# Patient Record
Sex: FEMALE | Race: WHITE | NOT HISPANIC OR LATINO | Employment: PART TIME | URBAN - METROPOLITAN AREA
[De-identification: names, ages, dates, MRNs, and addresses within clinical notes are randomized per-mention and may not be internally consistent; named-entity substitution may affect disease eponyms.]

---
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
## 2017-02-27 NOTE — PATIENT INSTRUCTIONS
Lamictal 25 mg daily for 2 weeks then increase to 50 mg daily for 2 weeks.    Restart levothyroxine 100 mcg daily.    Next depo shot end of May.    I will message you via [employer] with lab results.    See me in 3 1/2 weeks for follow up.      Call me if any questions.  Halle Blue, CNP  [phone]      Preventive Health Recommendations  Female Ages 26 - 39  Yearly exam:   See your health care provider every year in order to    Review health changes.     Discuss preventive care.      Review your medicines if you your doctor has prescribed any.    Until age 30: Get a Pap test every three years (more often if you have had an abnormal result).    After age 30: Talk to your doctor about whether you should have a Pap test every 3 years or have a Pap test with HPV screening every 5 years.   You do not need a Pap test if your uterus was removed (hysterectomy) and you have not had cancer.  You should be tested each year for STDs (sexually transmitted diseases), if you're at risk.   Talk to your provider about how often to have your cholesterol checked.  If you are at risk for diabetes, you should have a diabetes test (fasting glucose).  Shots: Get a flu shot each year. Get a tetanus shot every 10 years.   Nutrition:     Eat at least 5 servings of fruits and vegetables each day.    Eat whole-grain bread, whole-wheat pasta and brown rice instead of white grains and rice.    Talk to your provider about Calcium and Vitamin D.     Lifestyle    Exercise at least 150 minutes a week (30 minutes a day, 5 days of the week). This will help you control your weight and prevent disease.    Limit alcohol to one drink per day.    No smoking.     Wear sunscreen to prevent skin cancer.    See your dentist every six months for an exam and cleaning.

## 2017-03-01 ENCOUNTER — OFFICE VISIT (OUTPATIENT)
Dept: FAMILY MEDICINE | Facility: OTHER | Age: 27
End: 2017-03-01
Payer: COMMERCIAL

## 2017-03-01 VITALS
BODY MASS INDEX: 26.12 KG/M2 | WEIGHT: 156.8 LBS | RESPIRATION RATE: 16 BRPM | OXYGEN SATURATION: 99 % | HEIGHT: 65 IN | TEMPERATURE: 97.7 F | DIASTOLIC BLOOD PRESSURE: 78 MMHG | SYSTOLIC BLOOD PRESSURE: 108 MMHG | HEART RATE: 113 BPM

## 2017-03-01 DIAGNOSIS — N89.8 VAGINAL ODOR: ICD-10-CM

## 2017-03-01 DIAGNOSIS — Z30.013 ENCOUNTER FOR INITIAL PRESCRIPTION OF INJECTABLE CONTRACEPTIVE: ICD-10-CM

## 2017-03-01 DIAGNOSIS — Z30.42 ENCOUNTER FOR SURVEILLANCE OF INJECTABLE CONTRACEPTIVE: ICD-10-CM

## 2017-03-01 DIAGNOSIS — Z12.4 SCREENING FOR MALIGNANT NEOPLASM OF CERVIX: ICD-10-CM

## 2017-03-01 DIAGNOSIS — Z00.00 ENCOUNTER FOR ROUTINE ADULT HEALTH EXAMINATION WITHOUT ABNORMAL FINDINGS: Primary | ICD-10-CM

## 2017-03-01 DIAGNOSIS — F31.62 BIPOLAR DISORDER, CURRENT EPISODE MIXED, MODERATE (H): ICD-10-CM

## 2017-03-01 DIAGNOSIS — E03.9 HYPOTHYROIDISM, UNSPECIFIED TYPE: ICD-10-CM

## 2017-03-01 DIAGNOSIS — Z11.3 ENCOUNTER FOR SCREENING EXAMINATION FOR SEXUALLY TRANSMITTED DISEASE: ICD-10-CM

## 2017-03-01 DIAGNOSIS — F17.200 TOBACCO USE DISORDER: ICD-10-CM

## 2017-03-01 LAB
BETA HCG QUAL IFA URINE: NEGATIVE
MICRO REPORT STATUS: NORMAL
SPECIMEN SOURCE: NORMAL
WET PREP SPEC: NORMAL

## 2017-03-01 PROCEDURE — 96372 THER/PROPH/DIAG INJ SC/IM: CPT | Performed by: STUDENT IN AN ORGANIZED HEALTH CARE EDUCATION/TRAINING PROGRAM

## 2017-03-01 PROCEDURE — 87210 SMEAR WET MOUNT SALINE/INK: CPT | Performed by: STUDENT IN AN ORGANIZED HEALTH CARE EDUCATION/TRAINING PROGRAM

## 2017-03-01 PROCEDURE — 99212 OFFICE O/P EST SF 10 MIN: CPT | Mod: 25 | Performed by: STUDENT IN AN ORGANIZED HEALTH CARE EDUCATION/TRAINING PROGRAM

## 2017-03-01 PROCEDURE — 36415 COLL VENOUS BLD VENIPUNCTURE: CPT | Performed by: STUDENT IN AN ORGANIZED HEALTH CARE EDUCATION/TRAINING PROGRAM

## 2017-03-01 PROCEDURE — 86780 TREPONEMA PALLIDUM: CPT | Performed by: STUDENT IN AN ORGANIZED HEALTH CARE EDUCATION/TRAINING PROGRAM

## 2017-03-01 PROCEDURE — 99395 PREV VISIT EST AGE 18-39: CPT | Mod: 25 | Performed by: STUDENT IN AN ORGANIZED HEALTH CARE EDUCATION/TRAINING PROGRAM

## 2017-03-01 PROCEDURE — 87591 N.GONORRHOEAE DNA AMP PROB: CPT | Performed by: STUDENT IN AN ORGANIZED HEALTH CARE EDUCATION/TRAINING PROGRAM

## 2017-03-01 PROCEDURE — 84703 CHORIONIC GONADOTROPIN ASSAY: CPT | Performed by: STUDENT IN AN ORGANIZED HEALTH CARE EDUCATION/TRAINING PROGRAM

## 2017-03-01 PROCEDURE — G0145 SCR C/V CYTO,THINLAYER,RESCR: HCPCS | Performed by: STUDENT IN AN ORGANIZED HEALTH CARE EDUCATION/TRAINING PROGRAM

## 2017-03-01 PROCEDURE — 84439 ASSAY OF FREE THYROXINE: CPT | Performed by: STUDENT IN AN ORGANIZED HEALTH CARE EDUCATION/TRAINING PROGRAM

## 2017-03-01 PROCEDURE — 87389 HIV-1 AG W/HIV-1&-2 AB AG IA: CPT | Performed by: STUDENT IN AN ORGANIZED HEALTH CARE EDUCATION/TRAINING PROGRAM

## 2017-03-01 PROCEDURE — 84443 ASSAY THYROID STIM HORMONE: CPT | Performed by: STUDENT IN AN ORGANIZED HEALTH CARE EDUCATION/TRAINING PROGRAM

## 2017-03-01 PROCEDURE — 87491 CHLMYD TRACH DNA AMP PROBE: CPT | Performed by: STUDENT IN AN ORGANIZED HEALTH CARE EDUCATION/TRAINING PROGRAM

## 2017-03-01 RX ORDER — LAMOTRIGINE 25 MG/1
25 TABLET ORAL DAILY
Qty: 42 TABLET | Refills: 0 | Status: SHIPPED | OUTPATIENT
Start: 2017-03-01 | End: 2017-09-29

## 2017-03-01 RX ORDER — MEDROXYPROGESTERONE ACETATE 150 MG/ML
150 INJECTION, SUSPENSION INTRAMUSCULAR
Qty: 3 ML | Refills: 3 | OUTPATIENT
Start: 2017-03-01 | End: 2017-09-29

## 2017-03-01 ASSESSMENT — ANXIETY QUESTIONNAIRES
GAD7 TOTAL SCORE: 21
7. FEELING AFRAID AS IF SOMETHING AWFUL MIGHT HAPPEN: 3 = NEARLY EVERY DAY

## 2017-03-01 ASSESSMENT — PAIN SCALES - GENERAL: PAINLEVEL: MODERATE PAIN (5)

## 2017-03-01 NOTE — PROGRESS NOTES
Discussed these results with patient in the clinic.    Molly Blue NP-C  [facility]

## 2017-03-01 NOTE — MR AVS SNAPSHOT
After Visit Summary   3/1/2017    Evita Cornelius    MRN: [id number]           Patient Information     Date Of Birth          1990        Visit Information        Provider Department      3/1/2017 2:40 PM Molly Blue APRN [facility]        Today's Diagnoses     Encounter for initial prescription of injectable contraceptive    -  1    Screening for malignant neoplasm of cervix        Tobacco use disorder        Need for HPV vaccine        Need for prophylactic vaccination and inoculation against influenza        Hypothyroidism, unspecified type        Encounter for screening examination for sexually transmitted disease        Bipolar disorder, current episode mixed, moderate (H)        Vaginal odor        Encounter for surveillance of injectable contraceptive          Care Instructions    Lamictal 25 mg daily for 2 weeks then increase to 50 mg daily for 2 weeks.    Restart levothyroxine 100 mcg daily.    Next depo shot end of May.    I will message you via [employer] with lab results.    See me in 3 1/2 weeks for follow up.      Call me if any questions.  Halle Blue, CNP  [phone]      Preventive Health Recommendations  Female Ages 26 - 39  Yearly exam:   See your health care provider every year in order to    Review health changes.     Discuss preventive care.      Review your medicines if you your doctor has prescribed any.    Until age 30: Get a Pap test every three years (more often if you have had an abnormal result).    After age 30: Talk to your doctor about whether you should have a Pap test every 3 years or have a Pap test with HPV screening every 5 years.   You do not need a Pap test if your uterus was removed (hysterectomy) and you have not had cancer.  You should be tested each year for STDs (sexually transmitted diseases), if you're at risk.   Talk to your provider about how often to have your cholesterol checked.  If you are at risk for diabetes,  you should have a diabetes test (fasting glucose).  Shots: Get a flu shot each year. Get a tetanus shot every 10 years.   Nutrition:     Eat at least 5 servings of fruits and vegetables each day.    Eat whole-grain bread, whole-wheat pasta and brown rice instead of white grains and rice.    Talk to your provider about Calcium and Vitamin D.     Lifestyle    Exercise at least 150 minutes a week (30 minutes a day, 5 days of the week). This will help you control your weight and prevent disease.    Limit alcohol to one drink per day.    No smoking.     Wear sunscreen to prevent skin cancer.    See your dentist every six months for an exam and cleaning.          Follow-ups after your visit        Who to contact     If you have questions or need follow up information about today's clinic visit or your schedule please contact [facility] directly at [phone].  Normal or non-critical lab and imaging results will be communicated to you by [employer], letter or phone within 4 business days after the clinic has received the results. If you do not hear from us within 7 days, please contact the clinic through [employer] or phone. If you have a critical or abnormal lab result, we will notify you by phone as soon as possible.  Submit refill requests through Walk-in or call your pharmacy and they will forward the refill request to us. Please allow 3 business days for your refill to be completed.          Additional Information About Your Visit        [employer] Information     Walk-in gives you secure access to your electronic health record. If you see a primary care provider, you can also send messages to your care team and make appointments. If you have questions, please call your primary care clinic.  If you do not have a primary care provider, please call [phone] and they will assist you.        Care EveryWhere ID     This is your Care EveryWhere ID. This could be used by other organizations to access your  "[facility] medical records  [id number]        Your Vitals Were     Pulse Temperature Respirations Height Last Period Pulse Oximetry    113 97.7  F (36.5  C) (Temporal) 16 5' 5.12\" (1.654 m) 02/08/2017 (Approximate) 99%    BMI (Body Mass Index)                   26 kg/m2            Blood Pressure from Last 3 Encounters:   03/01/17 108/78   08/02/16 127/84   05/01/16 125/77    Weight from Last 3 Encounters:   03/01/17 156 lb 12.8 oz (71.1 kg)   08/02/16 148 lb (67.1 kg)   05/01/16 149 lb (67.6 kg)              We Performed the Following     Anti Treponema     Beta HCG qual IFA urine     DEPRESSION ACTION PLAN (DAP) Order [[id number]]     HIV Antigen Antibody Combo     Pap imaged thin layer screen reflex to HPV if ASCUS - recommend age 25 - 29     TOBACCO CESSATION ORDER FOR HM     TSH with free T4 reflex     Wet prep          Today's Medication Changes          These changes are accurate as of: 3/1/17  4:24 PM.  If you have any questions, ask your nurse or doctor.               Start taking these medicines.        Dose/Directions    lamoTRIgine 25 MG tablet   Commonly known as:  LaMICtal   Used for:  Bipolar disorder, current episode mixed, moderate (H)   Started by:  Molly Blue APRN CNP        Dose:  25 mg   Take 1 tablet (25 mg) by mouth daily for 2 weeks; then 50 mg once daily for 2 weeks   Quantity:  42 tablet   Refills:  0            Where to get your medicines      These medications were sent to [facility] [address] [address]     Phone:  [phone]     lamoTRIgine 25 MG tablet         Some of these will need a paper prescription and others can be bought over the counter.  Ask your nurse if you have questions.     You don't need a prescription for these medications     medroxyPROGESTERone 150 MG/ML injection                Primary Care Provider Office Phone # Fax #    DEBBIE Kennedy [phone]    "    [facility] [address]  [facility] [address]        Thank you!     Thank you for choosing [facility]  for your care. Our goal is always to provide you with excellent care. Hearing back from our patients is one way we can continue to improve our services. Please take a few minutes to complete the written survey that you may receive in the mail after your visit with us. Thank you!             Your Updated Medication List - Protect others around you: Learn how to safely use, store and throw away your medicines at www.disposemymeds.org.          This list is accurate as of: 3/1/17  4:24 PM.  Always use your most recent med list.                   Brand Name Dispense Instructions for use    lamoTRIgine 25 MG tablet    LaMICtal    42 tablet    Take 1 tablet (25 mg) by mouth daily for 2 weeks; then 50 mg once daily for 2 weeks       levothyroxine 100 MCG tablet    SYNTHROID    30 tablet    Take 1 tablet (100 mcg) by mouth daily       medroxyPROGESTERone 150 MG/ML injection    DEPO-PROVERA    3 mL    Inject 1 mL (150 mg) into the muscle every 3 months       oxyCODONE-acetaminophen 5-325 MG per tablet    PERCOCET    15 tablet    Take 1-2 tablets by mouth every 4 hours as needed for moderate to severe pain

## 2017-03-01 NOTE — NURSING NOTE
BLOOD PRESSURE: 108/78    DATE OF LAST PAP or ANNUAL EXAM:   Lab Results   Component Value Date    PAP NIL 02/26/2014     URINE HCG:negative    The following medication was given:     MEDICATION: Depo Provera 150mg  ROUTE: IM  SITE: Arm - Left  : Sova  LOT #: [id number]  EXPIRATION:02/2019  NEXT INJECTION DUE: May 17 - May 31  Provider: Molly Blue

## 2017-03-01 NOTE — NURSING NOTE
"Chief Complaint   Patient presents with     Physical     Panel Management     height, hpv, flu, pap, tobacco, dap, phq       Initial /78 (BP Location: Left arm, Patient Position: Chair, Cuff Size: Adult Regular)  Pulse 113  Temp 97.7  F (36.5  C) (Temporal)  Resp 16  Ht 5' 5.12\" (1.654 m)  Wt 156 lb 12.8 oz (71.1 kg)  LMP 02/08/2017 (Approximate)  SpO2 99%  BMI 26 kg/m2 Estimated body mass index is 26 kg/(m^2) as calculated from the following:    Height as of this encounter: 5' 5.12\" (1.654 m).    Weight as of this encounter: 156 lb 12.8 oz (71.1 kg).  Medication Reconciliation: complete   Ashley Perdomo CMA      "

## 2017-03-01 NOTE — PROGRESS NOTES
Discussed these results with patient in the clinic.    Molly Blue NP-C  [facility]

## 2017-03-02 LAB
HIV 1+2 AB+HIV1 P24 AG SERPL QL IA: NORMAL
T PALLIDUM IGG+IGM SER QL: NEGATIVE
T4 FREE SERPL-MCNC: 0.5 NG/DL (ref 0.76–1.46)
TSH SERPL DL<=0.005 MIU/L-ACNC: 76.1 MU/L (ref 0.4–4)

## 2017-03-03 ENCOUNTER — TELEPHONE (OUTPATIENT)
Dept: FAMILY MEDICINE | Facility: OTHER | Age: 27
End: 2017-03-03

## 2017-03-03 DIAGNOSIS — Z11.3 ENCOUNTER FOR SCREENING EXAMINATION FOR SEXUALLY TRANSMITTED DISEASE: Primary | ICD-10-CM

## 2017-03-03 NOTE — TELEPHONE ENCOUNTER
Reason for call:  [facility] calling about appt they ar canceling.   [phone] Jayshree

## 2017-03-03 NOTE — TELEPHONE ENCOUNTER
Huddled with EM about this - will have patient come back in for urine sample. Order pended, please sign.  Fabiana Erickson CMA

## 2017-03-03 NOTE — TELEPHONE ENCOUNTER
Called Jayshree from Infectious Disease at [facility] - she states they are cancelling the test for Chlamydia/Gonorrhea due to inappropriate specimen. She states it was sent with a blue swab (which is correct) but it was also sent with a large white swab in the container. This compromises test and makes it invalid. Will need to recollect and reorder a specimen.  Will route to EM to review/advise.  Fabiana Erickson, CMA

## 2017-03-04 PROBLEM — F31.62 BIPOLAR DISORDER, CURRENT EPISODE MIXED, MODERATE (H): Status: ACTIVE | Noted: 2017-03-04

## 2017-03-06 LAB
COPATH REPORT: NORMAL
PAP: NORMAL

## 2017-09-29 ENCOUNTER — OFFICE VISIT (OUTPATIENT)
Dept: FAMILY MEDICINE | Facility: OTHER | Age: 27
End: 2017-09-29
Payer: COMMERCIAL

## 2017-09-29 VITALS
WEIGHT: 173.6 LBS | SYSTOLIC BLOOD PRESSURE: 92 MMHG | RESPIRATION RATE: 18 BRPM | TEMPERATURE: 98 F | DIASTOLIC BLOOD PRESSURE: 62 MMHG | BODY MASS INDEX: 28.78 KG/M2 | OXYGEN SATURATION: 98 % | HEART RATE: 76 BPM

## 2017-09-29 DIAGNOSIS — N92.6 MISSED PERIOD: Primary | ICD-10-CM

## 2017-09-29 DIAGNOSIS — E03.9 HYPOTHYROIDISM, UNSPECIFIED TYPE: ICD-10-CM

## 2017-09-29 DIAGNOSIS — Z23 NEED FOR PROPHYLACTIC VACCINATION AND INOCULATION AGAINST INFLUENZA: ICD-10-CM

## 2017-09-29 DIAGNOSIS — E03.9 ACQUIRED HYPOTHYROIDISM: ICD-10-CM

## 2017-09-29 LAB
BETA HCG QUAL IFA URINE: NEGATIVE
T4 FREE SERPL-MCNC: 0.56 NG/DL (ref 0.76–1.46)
TSH SERPL DL<=0.005 MIU/L-ACNC: 37.2 MU/L (ref 0.4–4)

## 2017-09-29 PROCEDURE — 90686 IIV4 VACC NO PRSV 0.5 ML IM: CPT | Performed by: NURSE PRACTITIONER

## 2017-09-29 PROCEDURE — 99213 OFFICE O/P EST LOW 20 MIN: CPT | Mod: 25 | Performed by: NURSE PRACTITIONER

## 2017-09-29 PROCEDURE — 84439 ASSAY OF FREE THYROXINE: CPT | Performed by: NURSE PRACTITIONER

## 2017-09-29 PROCEDURE — 36415 COLL VENOUS BLD VENIPUNCTURE: CPT | Performed by: NURSE PRACTITIONER

## 2017-09-29 PROCEDURE — 84443 ASSAY THYROID STIM HORMONE: CPT | Performed by: NURSE PRACTITIONER

## 2017-09-29 PROCEDURE — 90471 IMMUNIZATION ADMIN: CPT | Performed by: NURSE PRACTITIONER

## 2017-09-29 PROCEDURE — 84703 CHORIONIC GONADOTROPIN ASSAY: CPT | Performed by: NURSE PRACTITIONER

## 2017-09-29 RX ORDER — LEVOTHYROXINE SODIUM 100 UG/1
100 TABLET ORAL DAILY
Qty: 30 TABLET | Refills: 5 | Status: SHIPPED | OUTPATIENT
Start: 2017-09-29 | End: 2019-04-24

## 2017-09-29 ASSESSMENT — ANXIETY QUESTIONNAIRES
IF YOU CHECKED OFF ANY PROBLEMS ON THIS QUESTIONNAIRE, HOW DIFFICULT HAVE THESE PROBLEMS MADE IT FOR YOU TO DO YOUR WORK, TAKE CARE OF THINGS AT HOME, OR GET ALONG WITH OTHER PEOPLE: EXTREMELY DIFFICULT
6. BECOMING EASILY ANNOYED OR IRRITABLE: NEARLY EVERY DAY
1. FEELING NERVOUS, ANXIOUS, OR ON EDGE: NEARLY EVERY DAY
GAD7 TOTAL SCORE: 20
3. WORRYING TOO MUCH ABOUT DIFFERENT THINGS: NEARLY EVERY DAY
5. BEING SO RESTLESS THAT IT IS HARD TO SIT STILL: NEARLY EVERY DAY
2. NOT BEING ABLE TO STOP OR CONTROL WORRYING: MORE THAN HALF THE DAYS
7. FEELING AFRAID AS IF SOMETHING AWFUL MIGHT HAPPEN: NEARLY EVERY DAY

## 2017-09-29 ASSESSMENT — PATIENT HEALTH QUESTIONNAIRE - PHQ9
10. IF YOU CHECKED OFF ANY PROBLEMS, HOW DIFFICULT HAVE THESE PROBLEMS MADE IT FOR YOU TO DO YOUR WORK, TAKE CARE OF THINGS AT HOME, OR GET ALONG WITH OTHER PEOPLE: VERY DIFFICULT
SUM OF ALL RESPONSES TO PHQ QUESTIONS 1-9: 23
5. POOR APPETITE OR OVEREATING: NEARLY EVERY DAY
SUM OF ALL RESPONSES TO PHQ QUESTIONS 1-9: 23

## 2017-09-29 NOTE — PROGRESS NOTES
SUBJECTIVE:                                                    Evita Cornelius is a 27 year old female who presents to clinic today for the following health issues:    History of Present Illness   Depression & Anxiety Follow-up:     Depression/Anxiety:  Depression & Anxiety    Status since last visit::  Worsened    Other associated symptoms of depression and anxiety::  None    Significant life event::  YES (was in [facility])    Current substance use::  None  Hypothyroidism:     Since last visit, patient describes the following symptoms::  Hair loss and Weight gain    Weight gain::  >20 lbs.    Answers for HPI/ROS submitted by the patient on 9/29/2017   If you checked off any problems, how difficult have these problems made it for you to do your work, take care of things at home, or get along with other people?: Very difficult  PHQ9 TOTAL SCORE: 23    JOSE-7 SCORE 5/28/2015 3/1/2017 9/29/2017   Total Score 14 - -   Total Score - 21 (severe anxiety) -   Total Score - - 20       Hypothyroidism Follow-up      Since last visit, patient describes the following symptoms: weight gain of  lbs, anxiety and hair loss    Wt Readings from Last 2 Encounters:   09/29/17 173 lb 9.6 oz (78.7 kg)   03/01/17 156 lb 12.8 oz (71.1 kg)         Problem list and histories reviewed & adjusted, as indicated.  Additional history: as documented    Abnormal Mood Symptoms      Duration: Charges with the law, trying to get kids back,looking to do inpatient for drugs- Meth     Description:  Depression: YES  Anxiety: YES  Panic attacks: YES     Accompanying signs and symptoms: see PHQ-9 and JOSE scores    History (similar episodes/previous evaluation): Yes not as bad as it is now     Precipitating or alleviating factors: Trouble with the law, just got out of senior care was there for about a month then went back over night due to a dirty UA found drugs.     Therapies tried and outcome: Unsure, problems remembering to take medications.     LMP- August 17th.  Late by a week.         Current Outpatient Prescriptions   Medication Sig Dispense Refill     lamoTRIgine (LAMICTAL) 25 MG tablet Take 1 tablet (25 mg) by mouth daily for 2 weeks; then 50 mg once daily for 2 weeks 42 tablet 0     medroxyPROGESTERone (DEPO-PROVERA) 150 MG/ML injection Inject 1 mL (150 mg) into the muscle every 3 months 3 mL 3     levothyroxine (SYNTHROID) 100 MCG tablet Take 1 tablet (100 mcg) by mouth daily 30 tablet 5     oxyCODONE-acetaminophen (PERCOCET) 5-325 MG per tablet Take 1-2 tablets by mouth every 4 hours as needed for moderate to severe pain 15 tablet 0     Allergies   Allergen Reactions     No Known Drug Allergies      BP Readings from Last 3 Encounters:   09/29/17 92/62   03/01/17 108/78   08/02/16 127/84    Wt Readings from Last 3 Encounters:   09/29/17 173 lb 9.6 oz (78.7 kg)   03/01/17 156 lb 12.8 oz (71.1 kg)   08/02/16 148 lb (67.1 kg)               ROS:  C: NEGATIVE for fever Positive for chills and weight gain.   E/M: NEGATIVE for ear, mouth and throat problems  R: NEGATIVE for significant cough or SOB  CV: NEGATIVE for chest pain, palpitations or peripheral edema    OBJECTIVE:     BP 92/62 (BP Location: Left arm, Patient Position: Chair, Cuff Size: Adult Large)  Pulse 76  Temp 98  F (36.7  C) (Temporal)  Resp 18  Wt 173 lb 9.6 oz (78.7 kg)  SpO2 98%  BMI 28.78 kg/m2  Body mass index is 28.78 kg/(m^2).  GENERAL: healthy, alert and no distress  EYES: Eyes grossly normal to inspection, PERRL and conjunctivae and sclerae normal  NECK: no adenopathy, no asymmetry, masses, or scars and thyroid normal to palpation  RESP: lungs clear to auscultation - no rales, rhonchi or wheezes  CV: regular rate and rhythm, normal S1 S2, no S3 or S4, no murmur, click or rub, no peripheral edema and peripheral pulses strong  ABDOMEN: soft, nontender, no hepatosplenomegaly, no masses and bowel sounds normal  SKIN: no suspicious lesions or rashes  NEURO: Normal strength and tone, mentation intact  and speech normal  PSYCH: mentation appears normal, affect normal/bright    Diagnostic Test Results:  Results for orders placed or performed in visit on 09/29/17   Beta HCG qual IFA urine - [facility] and [place]   Result Value Ref Range    Beta HCG Qual IFA Urine Negative NEG^Negative      TSH with free T4 reflex   Result Value Ref Range    TSH 37.20 (H) 0.40 - 4.00 mU/L   T4 free   Result Value Ref Range    T4 Free 0.56 (L) 0.76 - 1.46 ng/dL       ASSESSMENT/PLAN:     1. Missed period  - Urine pregnancy negative  - Advised to test at home if continued to not have period. Patient also just recently stopped Depo. Last Period August 17th.   - Beta HCG qual IFA urine - [facility] and [place]      2. Hypothyroidism, unspecified type  - Restart medication, recheck in 6 weeks. Since you have been off of it we know that you need to restart medication. If in 6 weeks still off will adjust dose if taking consistently.   - TSH with free T4 reflex  - TSH with free T4 reflex; Future    3. Need for prophylactic vaccination and inoculation against influenza    - FLU VAC, SPLIT VIRUS IM > 3 YO (QUADRIVALENT) [[id number]]  - Vaccine Administration, Initial [[id number]]      Addendum  4. Acquired hypothyroidism  - Recheck in 6 weeks, restart medication.   - levothyroxine (SYNTHROID) 100 MCG tablet; Take 1 tablet (100 mcg) by mouth daily  Dispense: 30 tablet; Refill: 5    Patient Instructions   - Will do pregnancy test today  - Will check TSH, restart your Levothyroxine at 100mcg we may need to increase depending on levels. Recheck in 6 weeks.   - Return to clinic in 3 months.     DEBBIE Holbrook CNP, APRN CNP  [facility]

## 2017-09-29 NOTE — MR AVS SNAPSHOT
After Visit Summary   9/29/2017    Evita Cornelius    MRN: [id number]           Patient Information     Date Of Birth          1990        Visit Information        Provider Department      9/29/2017 2:00 PM Almita Vieyra APRN CNP [facility]        Today's Diagnoses     Missed period    -  1    Hypothyroidism, unspecified type        Need for prophylactic vaccination and inoculation against influenza        Acquired hypothyroidism          Care Instructions    - Will do pregnancy test today  - Will check TSH, restart your Levothyroxine at 100mcg we may need to increase depending on levels. Recheck in 6 weeks.   - Return to clinic in 3 months.     DEBBIE Holbrook CNP            Follow-ups after your visit        Follow-up notes from your care team     Return in about 3 months (around 12/29/2017), or if symptoms worsen or fail to improve.      Who to contact     If you have questions or need follow up information about today's clinic visit or your schedule please contact [facility] directly at [phone].  Normal or non-critical lab and imaging results will be communicated to you by GleeMasterhart, letter or phone within 4 business days after the clinic has received the results. If you do not hear from us within 7 days, please contact the clinic through Netadmin or phone. If you have a critical or abnormal lab result, we will notify you by phone as soon as possible.  Submit refill requests through Netadmin or call your pharmacy and they will forward the refill request to us. Please allow 3 business days for your refill to be completed.          Additional Information About Your Visit        [employer] Information     Netadmin gives you secure access to your electronic health record. If you see a primary care provider, you can also send messages to your care team and make appointments. If you have questions, please call your primary care clinic.  If you do not have a  primary care provider, please call [phone] and they will assist you.        Care EveryWhere ID     This is your Care EveryWhere ID. This could be used by other organizations to access your [place] medical records  [id number]        Your Vitals Were     Pulse Temperature Respirations Pulse Oximetry BMI (Body Mass Index)       76 98  F (36.7  C) (Temporal) 18 98% 28.78 kg/m2        Blood Pressure from Last 3 Encounters:   09/29/17 92/62   03/01/17 108/78   08/02/16 127/84    Weight from Last 3 Encounters:   09/29/17 173 lb 9.6 oz (78.7 kg)   03/01/17 156 lb 12.8 oz (71.1 kg)   08/02/16 148 lb (67.1 kg)              We Performed the Following     Beta HCG qual IFA urine - [facility] and [facility]y     FLU VAC, SPLIT VIRUS IM > 3 YO (QUADRIVALENT) [[id number]]     T4 free     TSH with free T4 reflex     Vaccine Administration, Initial [30539]          Where to get your medicines      These medications were sent to [facility] - [address]   [address] [place] [address]     Phone:  [phone]     levothyroxine 100 MCG tablet          Primary Care Provider Office Phone # Fax #    Molly DEBBIE Leonard [facility] [phone]       [address]  [place] [address]        Equal Access to Services     ROLANDO ESPINOSA AH: Hadii nolvia ku hadasho Soomaali, waaxda luqadaha, qaybta kaalmada jennifer, thomas silverman. So [facility] [phone].    ATENCIÓN: Si habla español, tiene a davenport disposición servicios gratuitos de asistencia lingüística. Dilan al [phone].    We comply with applicable federal civil rights laws and [place] laws. We do not discriminate on the basis of race, color, national origin, age, disability, sex, sexual orientation, or gender identity.            Thank you!     Thank you for choosing [facility]  for your care. Our goal is always to provide you with excellent care. Hearing back from our patients  is one way we can continue to improve our services. Please take a few minutes to complete the written survey that you may receive in the mail after your visit with us. Thank you!             Your Updated Medication List - Protect others around you: Learn how to safely use, store and throw away your medicines at www.disposemymeds.org.          This list is accurate as of: 9/29/17 11:59 PM.  Always use your most recent med list.                   Brand Name Dispense Instructions for use Diagnosis    levothyroxine 100 MCG tablet    SYNTHROID    30 tablet    Take 1 tablet (100 mcg) by mouth daily    Acquired hypothyroidism

## 2017-09-29 NOTE — PATIENT INSTRUCTIONS
- Will do pregnancy test today  - Will check TSH, restart your Levothyroxine at 100mcg we may need to increase depending on levels. Recheck in 6 weeks.   - Return to clinic in 3 months.     DEBBIE Holbrook CNP

## 2017-09-30 ASSESSMENT — PATIENT HEALTH QUESTIONNAIRE - PHQ9: SUM OF ALL RESPONSES TO PHQ QUESTIONS 1-9: 23

## 2017-09-30 ASSESSMENT — ANXIETY QUESTIONNAIRES: GAD7 TOTAL SCORE: 20

## 2018-05-29 ENCOUNTER — TELEPHONE (OUTPATIENT)
Dept: FAMILY MEDICINE | Facility: OTHER | Age: 28
End: 2018-05-29

## 2018-05-29 ENCOUNTER — OFFICE VISIT (OUTPATIENT)
Dept: FAMILY MEDICINE | Facility: OTHER | Age: 28
End: 2018-05-29
Payer: COMMERCIAL

## 2018-05-29 VITALS
TEMPERATURE: 97.8 F | RESPIRATION RATE: 16 BRPM | WEIGHT: 169.4 LBS | HEIGHT: 65 IN | HEART RATE: 88 BPM | BODY MASS INDEX: 28.22 KG/M2 | SYSTOLIC BLOOD PRESSURE: 102 MMHG | DIASTOLIC BLOOD PRESSURE: 76 MMHG

## 2018-05-29 DIAGNOSIS — Z30.013 ENCOUNTER FOR INITIAL PRESCRIPTION OF INJECTABLE CONTRACEPTIVE: Primary | ICD-10-CM

## 2018-05-29 LAB — BETA HCG QUAL IFA URINE: NEGATIVE

## 2018-05-29 PROCEDURE — 84703 CHORIONIC GONADOTROPIN ASSAY: CPT | Performed by: FAMILY MEDICINE

## 2018-05-29 PROCEDURE — 96372 THER/PROPH/DIAG INJ SC/IM: CPT | Performed by: FAMILY MEDICINE

## 2018-05-29 PROCEDURE — 99213 OFFICE O/P EST LOW 20 MIN: CPT | Mod: 25 | Performed by: FAMILY MEDICINE

## 2018-05-29 RX ORDER — MEDROXYPROGESTERONE ACETATE 150 MG/ML
150 INJECTION, SUSPENSION INTRAMUSCULAR
Qty: 3 ML | Refills: 3 | OUTPATIENT
Start: 2018-05-29 | End: 2019-07-22

## 2018-05-29 ASSESSMENT — ANXIETY QUESTIONNAIRES
3. WORRYING TOO MUCH ABOUT DIFFERENT THINGS: NOT AT ALL
6. BECOMING EASILY ANNOYED OR IRRITABLE: NEARLY EVERY DAY
1. FEELING NERVOUS, ANXIOUS, OR ON EDGE: NOT AT ALL
7. FEELING AFRAID AS IF SOMETHING AWFUL MIGHT HAPPEN: NOT AT ALL
GAD7 TOTAL SCORE: 3
5. BEING SO RESTLESS THAT IT IS HARD TO SIT STILL: NOT AT ALL
2. NOT BEING ABLE TO STOP OR CONTROL WORRYING: NOT AT ALL
IF YOU CHECKED OFF ANY PROBLEMS ON THIS QUESTIONNAIRE, HOW DIFFICULT HAVE THESE PROBLEMS MADE IT FOR YOU TO DO YOUR WORK, TAKE CARE OF THINGS AT HOME, OR GET ALONG WITH OTHER PEOPLE: NOT DIFFICULT AT ALL

## 2018-05-29 ASSESSMENT — PATIENT HEALTH QUESTIONNAIRE - PHQ9: 5. POOR APPETITE OR OVEREATING: NOT AT ALL

## 2018-05-29 ASSESSMENT — PAIN SCALES - GENERAL: PAINLEVEL: NO PAIN (0)
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
## 2018-05-29 NOTE — LETTER
My Depression Action Plan  Name: Evita Cornelius   Date of Birth 1990  Date: 5/29/2018    My doctor: Molly Blue   My clinic: [facility]  [address] [place] [address] [phone]          GREEN    ZONE   Good Control    What it looks like:     Things are going generally well. You have normal up s and down s. You may even feel depressed from time to time, but bad moods usually last less than a day.   What you need to do:  1. Continue to care for yourself (see self care plan)  2. Check your depression survival kit and update it as needed  3. Follow your physician s recommendations including any medication.  4. Do not stop taking medication unless you consult with your physician first.           YELLOW         ZONE Getting Worse    What it looks like:     Depression is starting to interfere with your life.     It may be hard to get out of bed; you may be starting to isolate yourself from others.    Symptoms of depression are starting to last most all day and this has happened for several days.     You may have suicidal thoughts but they are not constant.   What you need to do:     1. Call your care team, your response to treatment will improve if you keep your care team informed of your progress. Yellow periods are signs an adjustment may need to be made.     2. Continue your self-care, even if you have to fake it!    3. Talk to someone in your support network    4. Open up your depression survival kit           RED    ZONE Medical Alert - Get Help    What it looks like:     Depression is seriously interfering with your life.     You may experience these or other symptoms: You can t get out of bed most days, can t work or engage in other necessary activities, you have trouble taking care of basic hygiene, or basic responsibilities, thoughts of suicide or death that will not go away, self-injurious behavior.     What you need to do:  1. Call your care  team and request a same-day appointment. If they are not available (weekends or after hours) call your local crisis line, emergency room or 911.            Depression Self Care Plan / Survival Kit    Self-Care for Depression  Here s the deal. Your body and mind are really not as separate as most people think.  What you do and think affects how you feel and how you feel influences what you do and think. This means if you do things that people who feel good do, it will help you feel better.  Sometimes this is all it takes.  There is also a place for medication and therapy depending on how severe your depression is, so be sure to consult with your medical provider and/ or Behavioral Health Consultant if your symptoms are worsening or not improving.     In order to better manage my stress, I will:    Exercise  Get some form of exercise, every day. This will help reduce pain and release endorphins, the  feel good  chemicals in your brain. This is almost as good as taking antidepressants!  This is not the same as joining a gym and then never going! (they count on that by the way ) It can be as simple as just going for a walk or doing some gardening, anything that will get you moving.      Hygiene   Maintain good hygiene (Get out of bed in the morning, Make your bed, Brush your teeth, Take a shower, and Get dressed like you were going to work, even if you are unemployed).  If your clothes don't fit try to get ones that do.    Diet  I will strive to eat foods that are good for me, drink plenty of water, and avoid excessive sugar, caffeine, alcohol, and other mood-altering substances.  Some foods that are helpful in depression are: complex carbohydrates, B vitamins, flaxseed, fish or fish oil, fresh fruits and vegetables.    Psychotherapy  I agree to participate in Individual Therapy (if recommended).    Medication  If prescribed medications, I agree to take them.  Missing doses can result in serious side effects.  I  understand that drinking alcohol, or other illicit drug use, may cause potential side effects.  I will not stop my medication abruptly without first discussing it with my provider.    Staying Connected With Others  I will stay in touch with my friends, family members, and my primary care provider/team.    Use your imagination  Be creative.  We all have a creative side; it doesn t matter if it s oil painting, sand castles, or mud pies! This will also kick up the endorphins.    Witness Beauty  (AKA stop and smell the roses) Take a look outside, even in mid-winter. Notice colors, textures. Watch the squirrels and birds.     Service to others  Be of service to others.  There is always someone else in need.  By helping others we can  get out of ourselves  and remember the really important things.  This also provides opportunities for practicing all the other parts of the program.    Humor  Laugh and be silly!  Adjust your TV habits for less news and crime-drama and more comedy.    Control your stress  Try breathing deep, massage therapy, biofeedback, and meditation. Find time to relax each day.     My support system    Clinic Contact:  Phone number:    Contact 1:  Phone number:    Contact 2:  Phone number:    Gnosticist/:  Phone number:    Therapist:  Phone number:    Local crisis center:    Phone number:    Other community support:  Phone number:

## 2018-05-29 NOTE — MR AVS SNAPSHOT
After Visit Summary   5/29/2018    Evita Cornelius    MRN: [id number]           Patient Information     Date Of Birth          1990        Visit Information        Provider Department      5/29/2018 2:45 PM Peter Gomez MD [facility]        Today's Diagnoses     Contraception    -  1      Care Instructions    Thank you for visiting [facility]        Please Follow Up when indicated on the section below this one on your After-Visit Summary.    You are due in 3 months     If you had imaging scheduled please refer to your radiology prep sheet.    Appointment    Date_______________     Time_____________    Day:   M TU W TH F    With____________________________    Location_________________________    If you need medication refills, please contact your pharmacy 3 days before your prescriptions runs out. If you are out of refills, your pharmacy will contact contact the clinic.    Contact us or return if questions or concerns.     -Your Care Team:  MD Kaylyn Vivar PA-C Joel De Haan, PA-C Elizabeth McLean, APRN CNP    General information about your clinic      Clinic hours:     Lab hours:  Phone [phone]  Monday 7:30 am-7 pm    Monday 8:30 am-6:30 pm  Tuesday-Friday 7:30 am-5 pm   Tuesday-Friday 8:30 am-4:30 pm    Pharmacy hours:  Phone [phone]  Monday 8:30 am-7pm  Tuesday-Friday 8:30am-6 pm                                       Mychart assistance [phone]        We would like to hear from you, how was your visit today?    Kay Vernon  Patient Information Supervisor   Patient Care Supervisor  [facility] [place], and [facility], and [facility]  [phone]              Follow-ups after your visit        Follow-up notes from your care team     Return in about 3 months (around 8/29/2018).      Who to contact     If you have questions or need follow  "up information about today's clinic visit or your schedule please contact [facility] directly at [phone].  Normal or non-critical lab and imaging results will be communicated to you by MyChart, letter or phone within 4 business days after the clinic has received the results. If you do not hear from us within 7 days, please contact the clinic through [employer] or phone. If you have a critical or abnormal lab result, we will notify you by phone as soon as possible.  Submit refill requests through [employer] or call your pharmacy and they will forward the refill request to us. Please allow 3 business days for your refill to be completed.          Additional Information About Your Visit        [employer] Information     [employer] gives you secure access to your electronic health record. If you see a primary care provider, you can also send messages to your care team and make appointments. If you have questions, please call your primary care clinic.  If you do not have a primary care provider, please call [phone] and they will assist you.        Care EveryWhere ID     This is your Care EveryWhere ID. This could be used by other organizations to access your [place] medical records  [id number]        Your Vitals Were     Pulse Temperature Respirations Height BMI (Body Mass Index)       88 97.8  F (36.6  C) (Temporal) 16 5' 4.5\" (1.638 m) 28.63 kg/m2        Blood Pressure from Last 3 Encounters:   05/29/18 102/76   09/29/17 92/62   03/01/17 108/78    Weight from Last 3 Encounters:   05/29/18 169 lb 6.4 oz (76.8 kg)   09/29/17 173 lb 9.6 oz (78.7 kg)   03/01/17 156 lb 12.8 oz (71.1 kg)              We Performed the Following     Beta HCG qual IFA urine        Primary Care Provider Office Phone # Fax #    DEBBIE Kennedy [phone]       [address] [facility] [address]        Equal Access to Services     JULIANNA ESPINOSA AH: yoko Gatica, " thomas asencio mariajoselani monteroюлия palomares ah. So [facility] [phone].    ATENCIÓN: Si norah milan, tiene a davenport disposición servicios gratuitos de asistencia lingüística. Dilan al [phone].    We comply with applicable federal civil rights laws and [place] laws. We do not discriminate on the basis of race, color, national origin, age, disability, sex, sexual orientation, or gender identity.            Thank you!     Thank you for choosing [facility]  for your care. Our goal is always to provide you with excellent care. Hearing back from our patients is one way we can continue to improve our services. Please take a few minutes to complete the written survey that you may receive in the mail after your visit with us. Thank you!             Your Updated Medication List - Protect others around you: Learn how to safely use, store and throw away your medicines at www.disposemymeds.org.          This list is accurate as of 5/29/18  3:46 PM.  Always use your most recent med list.                   Brand Name Dispense Instructions for use Diagnosis    levothyroxine 100 MCG tablet    SYNTHROID    30 tablet    Take 1 tablet (100 mcg) by mouth daily    Acquired hypothyroidism

## 2018-05-29 NOTE — TELEPHONE ENCOUNTER
Reason for Call:  Other appointment    Detailed comments: Pt is scheduled at 10:30 today but was wondering if someone could see her a little later this afternoon instead. Please advise.     Phone Number Patient can be reached at: Home number on file [phone] (home)    Best Time: any     Can we leave a detailed message on this number? YES    Call taken on 5/29/2018 at 8:28 AM by Judit Nazario

## 2018-05-29 NOTE — NURSING NOTE
Prior to injection verified patient identity using patient's name and date of birth.  Due to injection administration, patient instructed to remain in clinic for 15 minutes  afterwards, and to report any adverse reaction to me immediately.    BP: 102/76    LAST PAP/EXAM:   Lab Results   Component Value Date    PAP NIL 03/01/2017     URINE HCG:negative    The following medication was given:     MEDICATION: Depo Provera 150mg  ROUTE: IM  SITE: Deltoid - Left  : HoverWind  LOT #: [id number]  EXP:07/01/2020  NEXT INJECTION DUE: 8/14/18 - 8/28/18   Provider: Dr. Gomez  NDC: [address]-6  Vicente Payton, [facility]

## 2018-05-29 NOTE — PATIENT INSTRUCTIONS
Thank you for visiting [facility]        Please Follow Up when indicated on the section below this one on your After-Visit Summary.    You are due in 3 months     If you had imaging scheduled please refer to your radiology prep sheet.    Appointment    Date_______________     Time_____________    Day:   M TU W TH F    With____________________________    Location_________________________    If you need medication refills, please contact your pharmacy 3 days before your prescriptions runs out. If you are out of refills, your pharmacy will contact contact the clinic.    Contact us or return if questions or concerns.     -Your Care Team:  MD Kaylyn Vivar PA-C Joel De Haan, PA-C Elizabeth McLean, APRN CNP    General information about your clinic      Clinic hours:     Lab hours:  Phone [phone]  Monday 7:30 am-7 pm    Monday 8:30 am-6:30 pm  Tuesday-Friday 7:30 am-5 pm   Tuesday-Friday 8:30 am-4:30 pm    Pharmacy hours:  Phone [phone]  Monday 8:30 am-7pm  Tuesday-Friday 8:30am-6 pm                                       Mychart assistance [phone]        We would like to hear from you, how was your visit today?    Kay Vernon  Patient Information Supervisor   Patient Care Supervisor  [facility], and [facility], and [facility]  [phone]

## 2018-05-29 NOTE — TELEPHONE ENCOUNTER
Spoke with patient appointment time has been re-scheduled to 5/29/2018 2pm  Closing encounter  Ginger Kohli RT (R)

## 2018-05-30 ASSESSMENT — ANXIETY QUESTIONNAIRES: GAD7 TOTAL SCORE: 3

## 2018-05-30 ASSESSMENT — PATIENT HEALTH QUESTIONNAIRE - PHQ9: SUM OF ALL RESPONSES TO PHQ QUESTIONS 1-9: 0

## 2018-08-31 ENCOUNTER — ALLIED HEALTH/NURSE VISIT (OUTPATIENT)
Dept: NURSING | Facility: CLINIC | Age: 28
End: 2018-08-31
Payer: COMMERCIAL

## 2018-08-31 VITALS
DIASTOLIC BLOOD PRESSURE: 89 MMHG | HEART RATE: 76 BPM | BODY MASS INDEX: 28.05 KG/M2 | WEIGHT: 166 LBS | SYSTOLIC BLOOD PRESSURE: 123 MMHG

## 2018-08-31 DIAGNOSIS — Z30.42 SURVEILLANCE FOR INJECTABLE MEDROXYPROGESTERONE/ESTRADIOL: ICD-10-CM

## 2018-08-31 LAB — BETA HCG QUAL IFA URINE: NEGATIVE

## 2018-08-31 PROCEDURE — 99207 ZZC NO CHARGE NURSE ONLY: CPT

## 2018-08-31 PROCEDURE — 84703 CHORIONIC GONADOTROPIN ASSAY: CPT | Performed by: STUDENT IN AN ORGANIZED HEALTH CARE EDUCATION/TRAINING PROGRAM

## 2018-08-31 PROCEDURE — 96372 THER/PROPH/DIAG INJ SC/IM: CPT

## 2018-08-31 NOTE — PROGRESS NOTES
BP: 123/89    LAST PAP/EXAM: Lab Results       Component                Value               Date                       PAP                      NIL                 03/01/2017            URINE HCG:negative    The following medication was given:     MEDICATION: Depo Provera 150mg  ROUTE: IM  SITE: Deltoid - Left  : imgfave  LOT #: [id number]  EXP:4/2020  NEXT INJECTION DUE: 11/16/18 - 11/30/18   Provider: alonzo Mckinney MA

## 2018-08-31 NOTE — MR AVS SNAPSHOT
After Visit Summary   8/31/2018    Evita Cornelius    MRN: [id number]           Patient Information     Date Of Birth          1990        Visit Information        Provider Department      8/31/2018 10:50 AM AN ANCILLARY [facility]        Today's Diagnoses     Contraception    -  1    Surveillance for injectable medroxyprogesterone/estradiol           Follow-ups after your visit        Who to contact     If you have questions or need follow up information about today's clinic visit or your schedule please contact [facility] directly at [phone].  Normal or non-critical lab and imaging results will be communicated to you by [employer]et[employer], letter or phone within 4 business days after the clinic has received the results. If you do not hear from us within 7 days, please contact the clinic through [employer] or phone. If you have a critical or abnormal lab result, we will notify you by phone as soon as possible.  Submit refill requests through [employer] or call your pharmacy and they will forward the refill request to us. Please allow 3 business days for your refill to be completed.          Additional Information About Your Visit        MyChart Information     [employer] gives you secure access to your electronic health record. If you see a primary care provider, you can also send messages to your care team and make appointments. If you have questions, please call your primary care clinic.  If you do not have a primary care provider, please call [phone] and they will assist you.        Care EveryWhere ID     This is your Care EveryWhere ID. This could be used by other organizations to access your [place] medical records  [id number]        Your Vitals Were     Pulse BMI (Body Mass Index)                76 28.05 kg/m2           Blood Pressure from Last 3 Encounters:   08/31/18 123/89   05/29/18 102/76   09/29/17 92/62    Weight from Last 3 Encounters:   08/31/18 166 lb (75.3  kg)   05/29/18 169 lb 6.4 oz (76.8 kg)   09/29/17 173 lb 9.6 oz (78.7 kg)              We Performed the Following     Beta HCG Qual, Urine - [facility] and [place] ([id number])     C Medroxyprogesterone inj/1mg     INJECTION INTRAMUSCULAR OR SUB-Q        Primary Care Provider Office Phone # Fax Nataly Blue, APRN [facility] [phone]       [address] [address] [place] [id number]        Equal Access to Services     [facility]: Hadii aad ku hadasho Soomaali, waaxda luqadaha, qaybta kaalmada adeegyada, waxcari palomares . So [facility] [phone].    ATENCIÓN: Si habla español, tiene a davenport disposición servicios gratuitos de asistencia lingüística. [facility] [phone].    We comply with applicable federal civil rights laws and [place] laws. We do not discriminate on the basis of race, color, national origin, age, disability, sex, sexual orientation, or gender identity.            Thank you!     Thank you for choosing [facility]  [facility]. Our goal is always to provide you with excellent care. Hearing back from our patients is one way we can continue to improve our services. Please take a few minutes to complete the written survey that you may receive in the mail after your visit with us. Thank you!             Your Updated Medication List - Protect others around you: Learn how to safely use, store and throw away your medicines at www.disposemymeds.org.          This list is accurate as of 8/31/18 11:57 AM.  Always use your most recent med list.                   Brand Name Dispense Instructions for use Diagnosis    levothyroxine 100 MCG tablet    SYNTHROID    30 tablet    Take 1 tablet (100 mcg) by mouth daily    Acquired hypothyroidism       medroxyPROGESTERone 150 MG/ML injection    DEPO-PROVERA    3 mL    Inject 1 mL (150 mg) into the muscle every 3 months    Encounter for initial prescription of injectable contraceptive

## 2019-04-22 NOTE — PROGRESS NOTES
SUBJECTIVE:   Evita Cornelius is a 29 year old female who presents to clinic today for the following   health issues:      Hypothyroidism Follow-up--patient stopped medication 2017      Since last visit, patient describes the following symptoms: Weight stable, no hair loss, no skin changes, no constipation, no loose stools, tremors, anxiety, depression and fatigue      Amount of exercise or physical activity: None    Problems taking medications regularly: Yes,      Medication side effects: not applicable    Diet: regular (no restrictions)      Cold intolerance and fatigued.       Additional history: as documented    Reviewed  and updated as needed this visit by clinical staff  Tobacco  Allergies  Meds         Reviewed and updated as needed this visit by Provider         BP Readings from Last 3 Encounters:   04/24/19 121/82   08/31/18 123/89   05/29/18 102/76    Wt Readings from Last 3 Encounters:   04/24/19 77.1 kg (170 lb)   08/31/18 75.3 kg (166 lb)   05/29/18 76.8 kg (169 lb 6.4 oz)                    All other systems negative except as outline above  OBJECTIVE:    Eye exam - right eye normal lid, conjunctiva, cornea, pupil and fundus, left eye normal lid, conjunctiva, cornea, pupil and fundus.  Thyroid not palpable, not enlarged, no nodules detected.  CHEST:chest clear to IPPA, no tachypnea, retractions or cyanosis and S1, S2 normal, no murmur, no gallop, rate regular.    Evita was seen today for thyroid problem.    Diagnoses and all orders for this visit:    Acquired hypothyroidism  -     levothyroxine (SYNTHROID) 100 MCG tablet; Take 1 tablet (100 mcg) by mouth daily  -     TSH  -     TSH; Future        work on lifestyle modification

## 2019-04-24 ENCOUNTER — OFFICE VISIT (OUTPATIENT)
Dept: FAMILY MEDICINE | Facility: CLINIC | Age: 29
End: 2019-04-24
Payer: COMMERCIAL

## 2019-04-24 VITALS
BODY MASS INDEX: 28.73 KG/M2 | TEMPERATURE: 98.4 F | OXYGEN SATURATION: 97 % | HEART RATE: 88 BPM | DIASTOLIC BLOOD PRESSURE: 82 MMHG | SYSTOLIC BLOOD PRESSURE: 121 MMHG | RESPIRATION RATE: 18 BRPM | WEIGHT: 170 LBS

## 2019-04-24 DIAGNOSIS — E03.9 ACQUIRED HYPOTHYROIDISM: ICD-10-CM

## 2019-04-24 LAB — TSH SERPL DL<=0.005 MIU/L-ACNC: 59.02 MU/L (ref 0.4–4)

## 2019-04-24 PROCEDURE — 84443 ASSAY THYROID STIM HORMONE: CPT | Performed by: PHYSICIAN ASSISTANT

## 2019-04-24 PROCEDURE — 36415 COLL VENOUS BLD VENIPUNCTURE: CPT | Performed by: PHYSICIAN ASSISTANT

## 2019-04-24 PROCEDURE — 99213 OFFICE O/P EST LOW 20 MIN: CPT | Performed by: PHYSICIAN ASSISTANT

## 2019-04-24 RX ORDER — LEVOTHYROXINE SODIUM 100 UG/1
100 TABLET ORAL DAILY
Qty: 90 TABLET | Refills: 0 | Status: SHIPPED | OUTPATIENT
Start: 2019-04-24 | End: 2019-04-25

## 2019-04-24 ASSESSMENT — ANXIETY QUESTIONNAIRES
6. BECOMING EASILY ANNOYED OR IRRITABLE: SEVERAL DAYS
1. FEELING NERVOUS, ANXIOUS, OR ON EDGE: SEVERAL DAYS
2. NOT BEING ABLE TO STOP OR CONTROL WORRYING: SEVERAL DAYS
3. WORRYING TOO MUCH ABOUT DIFFERENT THINGS: SEVERAL DAYS
GAD7 TOTAL SCORE: 7
5. BEING SO RESTLESS THAT IT IS HARD TO SIT STILL: SEVERAL DAYS
7. FEELING AFRAID AS IF SOMETHING AWFUL MIGHT HAPPEN: SEVERAL DAYS
IF YOU CHECKED OFF ANY PROBLEMS ON THIS QUESTIONNAIRE, HOW DIFFICULT HAVE THESE PROBLEMS MADE IT FOR YOU TO DO YOUR WORK, TAKE CARE OF THINGS AT HOME, OR GET ALONG WITH OTHER PEOPLE: SOMEWHAT DIFFICULT

## 2019-04-24 ASSESSMENT — PATIENT HEALTH QUESTIONNAIRE - PHQ9
SUM OF ALL RESPONSES TO PHQ QUESTIONS 1-9: 6
5. POOR APPETITE OR OVEREATING: SEVERAL DAYS

## 2019-04-25 ENCOUNTER — MYC REFILL (OUTPATIENT)
Dept: FAMILY MEDICINE | Facility: CLINIC | Age: 29
End: 2019-04-25

## 2019-04-25 DIAGNOSIS — E03.9 ACQUIRED HYPOTHYROIDISM: ICD-10-CM

## 2019-04-25 ASSESSMENT — ANXIETY QUESTIONNAIRES: GAD7 TOTAL SCORE: 7

## 2019-04-25 NOTE — TELEPHONE ENCOUNTER
Med was filled yesterday    levothyroxine (SYNTHROID) 100 MCG tablet 90 tablet 0 4/24/2019  No   Sig - Route: Take 1 tablet (100 mcg) by mouth daily - Oral   Sent to pharmacy as: levothyroxine (SYNTHROID) 100 MCG tablet   Class: E-Prescribe   Order: [id number]   E-Prescribing Status: Receipt confirmed by pharmacy (4/24/2019  2:52 PM CDT)

## 2019-05-23 ENCOUNTER — MYC REFILL (OUTPATIENT)
Dept: FAMILY MEDICINE | Facility: CLINIC | Age: 29
End: 2019-05-23

## 2019-05-23 DIAGNOSIS — E03.9 ACQUIRED HYPOTHYROIDISM: ICD-10-CM

## 2019-05-24 RX ORDER — LEVOTHYROXINE SODIUM 100 UG/1
100 TABLET ORAL DAILY
Qty: 90 TABLET | Refills: 0 | OUTPATIENT
Start: 2019-05-24

## 2019-05-28 ENCOUNTER — MYC REFILL (OUTPATIENT)
Dept: FAMILY MEDICINE | Facility: CLINIC | Age: 29
End: 2019-05-28

## 2019-05-28 DIAGNOSIS — E03.9 ACQUIRED HYPOTHYROIDISM: ICD-10-CM

## 2019-05-28 RX ORDER — LEVOTHYROXINE SODIUM 100 UG/1
100 TABLET ORAL DAILY
Qty: 90 TABLET | Refills: 0 | Status: CANCELLED | OUTPATIENT
Start: 2019-05-28

## 2019-05-28 NOTE — TELEPHONE ENCOUNTER
Routing refill request to provider for review/approval because:  See my chart message. Pended for approval  Aleta Rachel RN

## 2019-05-29 RX ORDER — LEVOTHYROXINE SODIUM 100 UG/1
100 TABLET ORAL DAILY
Qty: 30 TABLET | Refills: 0 | Status: SHIPPED | OUTPATIENT
Start: 2019-05-29 | End: 2019-07-22

## 2019-07-22 ENCOUNTER — OFFICE VISIT (OUTPATIENT)
Dept: FAMILY MEDICINE | Facility: CLINIC | Age: 29
End: 2019-07-22
Payer: COMMERCIAL

## 2019-07-22 ENCOUNTER — MYC MEDICAL ADVICE (OUTPATIENT)
Dept: FAMILY MEDICINE | Facility: CLINIC | Age: 29
End: 2019-07-22

## 2019-07-22 VITALS
HEIGHT: 65 IN | OXYGEN SATURATION: 99 % | DIASTOLIC BLOOD PRESSURE: 79 MMHG | WEIGHT: 170 LBS | HEART RATE: 83 BPM | BODY MASS INDEX: 28.32 KG/M2 | TEMPERATURE: 98.2 F | RESPIRATION RATE: 16 BRPM | SYSTOLIC BLOOD PRESSURE: 114 MMHG

## 2019-07-22 DIAGNOSIS — F17.200 TOBACCO USE DISORDER: ICD-10-CM

## 2019-07-22 DIAGNOSIS — E03.9 ACQUIRED HYPOTHYROIDISM: Primary | ICD-10-CM

## 2019-07-22 LAB
T4 FREE SERPL-MCNC: 0.36 NG/DL (ref 0.76–1.46)
TSH SERPL DL<=0.005 MIU/L-ACNC: 74.6 MU/L (ref 0.4–4)

## 2019-07-22 PROCEDURE — 84439 ASSAY OF FREE THYROXINE: CPT | Performed by: NURSE PRACTITIONER

## 2019-07-22 PROCEDURE — 84443 ASSAY THYROID STIM HORMONE: CPT | Performed by: NURSE PRACTITIONER

## 2019-07-22 PROCEDURE — 36415 COLL VENOUS BLD VENIPUNCTURE: CPT | Performed by: NURSE PRACTITIONER

## 2019-07-22 PROCEDURE — 99213 OFFICE O/P EST LOW 20 MIN: CPT | Performed by: NURSE PRACTITIONER

## 2019-07-22 RX ORDER — LEVOTHYROXINE SODIUM 100 UG/1
100 TABLET ORAL DAILY
Qty: 30 TABLET | Refills: 0 | Status: SHIPPED | OUTPATIENT
Start: 2019-07-22 | End: 2019-09-03

## 2019-07-22 ASSESSMENT — MIFFLIN-ST. JEOR: SCORE: 1501.37
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
## 2019-07-22 NOTE — PATIENT INSTRUCTIONS
Reminders: If you are signed up for mychart please be aware your results and communications will be sent within your mychart. Please remember to arrive 5-10 minutes early for your appointments. If you are late you may need to reschedule your appointment.

## 2019-09-03 DIAGNOSIS — E03.9 ACQUIRED HYPOTHYROIDISM: ICD-10-CM

## 2019-09-03 NOTE — TELEPHONE ENCOUNTER
"Requested Prescriptions   Pending Prescriptions Disp Refills     levothyroxine (SYNTHROID/LEVOTHROID) 100 MCG tablet [Pharmacy Med Name: LEVOTHYROXINE SODIUM 100MCG TABS] 30 tablet 0     Sig: TAKE ONE TABLET BY MOUTH ONCE DAILY   Last Written Prescription Date:  7-22-19  Last Fill Quantity: 30,  # refills: 0   Last office visit: 7/22/2019 with prescribing provider:  7-22-19   Future Office Visit:      Thyroid Protocol Failed - 9/3/2019  1:34 PM        Failed - Normal TSH on file in past 12 months     Recent Labs   Lab Test 07/22/19  0901   TSH 74.60*              Passed - Patient is 12 years or older        Passed - Recent (12 mo) or future (30 days) visit within the authorizing provider's specialty     Patient had office visit in the last 12 months or has a visit in the next 30 days with authorizing provider or within the authorizing provider's specialty.  See \"Patient Info\" tab in inbasket, or \"Choose Columns\" in Meds & Orders section of the refill encounter.              Passed - Medication is active on med list        Passed - No active pregnancy on record     If patient is pregnant or has had a positive pregnancy test, please check TSH.          Passed - No positive pregnancy test in past 12 months     If patient is pregnant or has had a positive pregnancy test, please check TSH.          "

## 2019-09-04 NOTE — TELEPHONE ENCOUNTER
Routing refill request to provider for review/approval because:  Failed protocol.   Patient due for follow up lab in late October.     Mariaelena Aaron RN BSN  on 9/4/2019 at 3:26 PM

## 2019-09-05 RX ORDER — LEVOTHYROXINE SODIUM 100 UG/1
TABLET ORAL
Qty: 30 TABLET | Refills: 1 | Status: SHIPPED | OUTPATIENT
Start: 2019-09-05 | End: 2021-11-23

## 2019-11-07 ENCOUNTER — HEALTH MAINTENANCE LETTER (OUTPATIENT)
Age: 29
End: 2019-11-07

## 2020-03-18 ENCOUNTER — ANCILLARY PROCEDURE (OUTPATIENT)
Dept: ULTRASOUND IMAGING | Facility: CLINIC | Age: 30
End: 2020-03-18
Attending: EMERGENCY MEDICINE
Payer: COMMERCIAL

## 2020-03-18 ENCOUNTER — HOSPITAL ENCOUNTER (EMERGENCY)
Facility: CLINIC | Age: 30
Discharge: HOME | End: 2020-03-18
Attending: EMERGENCY MEDICINE | Admitting: EMERGENCY MEDICINE
Payer: COMMERCIAL

## 2020-03-18 VITALS
DIASTOLIC BLOOD PRESSURE: 87 MMHG | HEIGHT: 64 IN | TEMPERATURE: 98.1 F | WEIGHT: 177 LBS | SYSTOLIC BLOOD PRESSURE: 130 MMHG | RESPIRATION RATE: 16 BRPM | BODY MASS INDEX: 30.22 KG/M2 | OXYGEN SATURATION: 98 % | HEART RATE: 85 BPM

## 2020-03-18 DIAGNOSIS — A59.9 TRICHOMONIASIS: ICD-10-CM

## 2020-03-18 DIAGNOSIS — O03.9 MISCARRIAGE: ICD-10-CM

## 2020-03-18 LAB
ABO + RH BLD: NORMAL
ABO + RH BLD: NORMAL
B-HCG SERPL-ACNC: 3301 IU/L (ref 0–5)
C TRACH DNA SPEC QL NAA+PROBE: NEGATIVE
N GONORRHOEA DNA SPEC QL NAA+PROBE: NEGATIVE
SPECIMEN EXP DATE BLD: NORMAL
SPECIMEN SOURCE: ABNORMAL
SPECIMEN SOURCE: NORMAL
SPECIMEN SOURCE: NORMAL
WET PREP SPEC: ABNORMAL

## 2020-03-18 PROCEDURE — 99284 EMERGENCY DEPT VISIT MOD MDM: CPT | Mod: Z6 | Performed by: EMERGENCY MEDICINE

## 2020-03-18 PROCEDURE — 99285 EMERGENCY DEPT VISIT HI MDM: CPT | Performed by: EMERGENCY MEDICINE

## 2020-03-18 PROCEDURE — 76801 OB US < 14 WKS SINGLE FETUS: CPT

## 2020-03-18 PROCEDURE — 87210 SMEAR WET MOUNT SALINE/INK: CPT | Performed by: EMERGENCY MEDICINE

## 2020-03-18 PROCEDURE — 87591 N.GONORRHOEAE DNA AMP PROB: CPT | Performed by: EMERGENCY MEDICINE

## 2020-03-18 PROCEDURE — 86901 BLOOD TYPING SEROLOGIC RH(D): CPT | Performed by: EMERGENCY MEDICINE

## 2020-03-18 PROCEDURE — 87491 CHLMYD TRACH DNA AMP PROBE: CPT | Performed by: EMERGENCY MEDICINE

## 2020-03-18 PROCEDURE — 84702 CHORIONIC GONADOTROPIN TEST: CPT | Performed by: EMERGENCY MEDICINE

## 2020-03-18 PROCEDURE — 86900 BLOOD TYPING SEROLOGIC ABO: CPT | Performed by: EMERGENCY MEDICINE

## 2020-03-18 RX ORDER — METRONIDAZOLE 500 MG/1
2000 TABLET ORAL ONCE
Status: DISCONTINUED | OUTPATIENT
Start: 2020-03-18 | End: 2020-03-18 | Stop reason: HOSPADM

## 2020-03-18 RX ORDER — ONDANSETRON 4 MG/1
4 TABLET, ORALLY DISINTEGRATING ORAL ONCE
Status: DISCONTINUED | OUTPATIENT
Start: 2020-03-18 | End: 2020-03-18 | Stop reason: HOSPADM

## 2020-03-18 ASSESSMENT — MIFFLIN-ST. JEOR: SCORE: 1512.87

## 2020-03-18 NOTE — ED TRIAGE NOTES
"Pt saw scant vaginal bleeding about 30 minutes ago.  Pt took at home pregnancy test two days ago that was positive.  Unconfirmed at a clinic.  Pt also c/o of cramping \"like my period.\"  "

## 2020-03-18 NOTE — RESULT ENCOUNTER NOTE
Final result for both N. Gonorrhoeae PCR and Chlamydia Trachomatis PCR are NEGATIVE.  No treatment or change in treatment per [facility] ED Lab Result protocol.

## 2020-03-18 NOTE — ED PROVIDER NOTES
History     Chief Complaint   Patient presents with     Vaginal Bleeding     Pt having scant vaginal bleeding, Pt took at home HCG test that was positive two days ago.  Unconfirmed at this time.     HPI  Evita Cornelius is a 30 year old  female with PMH notable for recent positive home pregnancy test who presents to the ED with vaginal bleeding. Patient reports LMP about 6 weeks ago, unsure of exact date. She had positive home pregnancy test 2 days ago. Small amount of vaginal bleeding was noticed tonight. She notes mild lower abdominal discomfort similar to mild menstrual cramps. No dysuria, no urgency/frequency, no diarrhea, no vomiting. She last used amphetamines 2 days ago.     Past Medical History  Past Medical History:   Diagnosis Date     Ingrowing nail     resection x2 left great toe      Moderate major depression (H) 2009     Pain      Pain      Papanicolaou smear of cervix with low grade squamous intraepithelial lesion (LGSIL) 2010     Thyroid disease      Past Surgical History:   Procedure Laterality Date     C FOOT/TOES SURGERY PROC UNLISTED       levothyroxine (SYNTHROID/LEVOTHROID) 100 MCG tablet      No Known Allergies  Past medical history, past surgical history, medications, and allergies were reviewed with the patient. Additional pertinent items: None    Family History  Family History   Problem Relation Age of Onset     Depression Maternal Grandmother      Diabetes Maternal Grandmother      Anxiety Disorder Maternal Grandmother      Hypertension Mother      Diabetes Mother      Depression Mother      Anxiety Disorder Mother      Depression Father      Substance Abuse Father      Heart Disease Daughter         heart murmer     Family history was reviewed with the patient. Additional pertinent items: None    Social History  Social History     Tobacco Use     Smoking status: Current Every Day Smoker     Packs/day: 0.50     Years: 15.00     Pack years: 7.50     Types: Cigarettes  "    Smokeless tobacco: Never Used     Tobacco comment: 1/2 PPD   Substance Use Topics     Alcohol use: No     Drug use: Yes     Types: Methamphetamines     Comment: smoking meth, last use 3/16/20      Social history was reviewed with the patient. Additional pertinent items: None    Review of Systems  A complete review of systems was performed with pertinent positives and negatives noted in the HPI, and all other systems negative.     Physical Exam   BP: 116/86  Pulse: 94  Temp: 98.1  F (36.7  C)  Resp: 18  Height: 162.6 cm (5' 4\")  Weight: 80.3 kg (177 lb)  SpO2: 99 %    Physical Exam  General: no acute distress. Appears stated age.   HENT: MMM, no oropharyngeal lesions  Eyes: PERRL, normal sclerae  Cardio: regular rate. Regular rhythm. Extremities well perfused  Resp: Normal work of breathing, normal respiratory rate  Abdomen: mild suprapubic and bilateral lower quadrant tenderness, non-distended, no rebound, no guarding  Pelvic: No external lesions. Trace blood discharge. Cervical os closed. No CMT. Mild bilateral adnexal tenderness.   Neuro: alert and fully oriented. CN II-XII grossly intact. Grossly normal strength and sensation in all extremities.   MSK: no deformities.   Integumentary/Skin: no rash visualized, normal color  Psych: normal affect, normal behavior    ED Course      Procedures  Results for orders placed during the hospital encounter of 03/18/20   POC US OB TRANSABDOMINAL LIMITED    Impression Limited Bedside Transabdominal ultrasound for evaluation of IUP  Performed any interpreted by Jose L Royal MD  Indication:  vaginal bleeding, positive pregnancy test  Findings:  The lower abdomen was interrogated with a curvilinear probe. The uterus was identified.   Within the uterus there is a gestational sac with fetus and CRL consistent with 7w5d gestation. Heartbeat not visualized. Subchorionic hemorrhage was visualized.   Impression: Intrauterine pregnancy with CRL consistent with 7w5d gestation. " Subchorionic hemorrhage present and heartbeat non-visualized with concern for likely non-viable pregnancy             Labs Ordered and Resulted from Time of ED Arrival Up to the Time of Departure from the ED   HCG QUANTITATIVE PREGNANCY - Abnormal; Notable for the following components:       Result Value    HCG Quantitative Serum 3,301 (*)     All other components within normal limits   WET PREP - Abnormal; Notable for the following components:    Wet Prep Motile Trichomonas seen (*)     All other components within normal limits   PREP FOR PROCEDURE   ABO AND RH   CHLAMYDIA TRACHOMATIS PCR   NEISSERIA GONORRHOEAE PCR     US OB <14 Weeks w Transvaginal Single   Final Result   IMPRESSION:    Nonviable intrauterine pregnancy. Moderate subchorionic hematoma.      POC US OB TRANSABDOMINAL LIMITED   Final Result   Limited Bedside Transabdominal ultrasound for evaluation of IUP   Performed any interpreted by Jose L Royal MD   Indication:  vaginal bleeding, positive pregnancy test   Findings:   The lower abdomen was interrogated with a curvilinear probe. The uterus was identified.    Within the uterus there is a gestational sac with fetus and CRL consistent with 7w5d gestation. Heartbeat not visualized. Subchorionic hemorrhage was visualized.    Impression: Intrauterine pregnancy with CRL consistent with 7w5d gestation. Subchorionic hemorrhage present and heartbeat non-visualized with concern for likely non-viable pregnancy                Assessments & Plan (with Medical Decision Making)   Patient presenting with vaginal bleeding, mild abdominal cramping, and report of positive home pregnancy test. Vitals in the ED unremarkable. Nursing notes reviewed. Initial differential diagnosis includes but not limited to threatened miscarriage, inevitable miscarriage, ectopic pregnancy, menses.     Pelvic exam with closed cervical os, trace blood in vaginal vault. Bedside transabdominal pelvic ultrasound visualized IUP without  visible heartbeat, subchorionic hemorrhage present. Beta hCG 3,301. Rh positive. Wet prep demonstrated trichomonas. Metronidazole given.     Formal pelvic ultrasound confirmed non-viable pregnancy. Patient notified of the finding and discussed options of expectant management vs medication vs D&C. Patient in favor of expectant management at home.     The complete clinical picture is most consistent with silent miscarriage and vaginal trichomonas. After counseling on the diagnosis, work-up, and treatment plan, the patient was discharged to home. OB-Gyn referral placed. The patient was advised to follow-up with OB-Gyn in a few days. The patient was advised to return to the ED if a concerning amount of pain or bleeding, or if there are any urgent/life-threatening concerns.     Final diagnoses:   Miscarriage   Trichomoniasis     Discharge Medication List as of 3/18/2020  5:20 AM          --  Jose L Royal MD   Emergency Medicine   [facility] EMERGENCY DEPARTMENT  3/18/2020     Jose L Royal MD  03/18/20 0648

## 2020-03-18 NOTE — DISCHARGE INSTRUCTIONS
Please make an appointment to follow up with OB/Gyn--[facility] (phone: [phone]) in 2-4 days. A referral has been placed.     Return to the ED if you are having a concerning amount of bleeding or pain, or any urgent/life-threatening concerns.

## 2020-03-20 ENCOUNTER — TELEPHONE (OUTPATIENT)
Dept: OBGYN | Facility: CLINIC | Age: 30
End: 2020-03-20

## 2020-03-20 ASSESSMENT — ANXIETY QUESTIONNAIRES
4. TROUBLE RELAXING: SEVERAL DAYS
5. BEING SO RESTLESS THAT IT IS HARD TO SIT STILL: SEVERAL DAYS
2. NOT BEING ABLE TO STOP OR CONTROL WORRYING: SEVERAL DAYS
7. FEELING AFRAID AS IF SOMETHING AWFUL MIGHT HAPPEN: SEVERAL DAYS
GAD7 TOTAL SCORE: 7
1. FEELING NERVOUS, ANXIOUS, OR ON EDGE: SEVERAL DAYS
3. WORRYING TOO MUCH ABOUT DIFFERENT THINGS: SEVERAL DAYS
6. BECOMING EASILY ANNOYED OR IRRITABLE: SEVERAL DAYS
7. FEELING AFRAID AS IF SOMETHING AWFUL MIGHT HAPPEN: SEVERAL DAYS

## 2020-03-20 NOTE — TELEPHONE ENCOUNTER
Pt called to request reschedule, missed appt for MAB follow up.     Pt states she was in a lot of pain last night and could not sleep, then cramping improved today and was able to sleep. Slept through appt time.    Pt states she had heavy bleeding overnight and passed several quarter sized clots. Cramping overnight moderate-severe, but now is mild. Bleeding now decreased to saturating a pad every 3 hours.     Nurse advised OK to reschedule on Monday, but gave bleeding precautions and when to present to ED.    Pt expressed understating and agrees with plan. Rescheduled appt to Monday at 1:00

## 2020-03-20 NOTE — TELEPHONE ENCOUNTER
Left message for patient to return call no showed appointment today.  Follow up from ER.  She is to return call with any questions concerns or to reschedule,

## 2020-03-21 ASSESSMENT — ANXIETY QUESTIONNAIRES: GAD7 TOTAL SCORE: 7

## 2020-03-23 ENCOUNTER — OFFICE VISIT (OUTPATIENT)
Dept: OBGYN | Facility: CLINIC | Age: 30
End: 2020-03-23
Attending: MIDWIFE
Payer: COMMERCIAL

## 2020-03-23 DIAGNOSIS — O02.1 MISSED AB: Primary | ICD-10-CM

## 2020-03-23 NOTE — LETTER
3/23/2020      RE: Evita Cornelius  [id number] [place] [id number]       Attempted to contact pt who missed her appt f/u MAB  No answer no messaging available      DEBBIE Kessler CNM

## 2020-03-23 NOTE — LETTER
3/23/2020       RE: Evita Cornelius  [id number]  [facility] [address]     Dear Colleague,    Thank you for referring your patient, Evita Cornelius, to the [facility]. Please see a copy of my visit note below.    Attempted to contact pt who missed her appt f/u MAB  No answer no messaging available      Again, thank you for allowing me to participate in the care of your patient.      Sincerely,    DEBBIE Kessler CNM

## 2020-11-29 ENCOUNTER — HEALTH MAINTENANCE LETTER (OUTPATIENT)
Age: 30
End: 2020-11-29

## 2021-01-27 ENCOUNTER — APPOINTMENT (OUTPATIENT)
Dept: URBAN - METROPOLITAN AREA CLINIC 252 | Age: 31
Setting detail: DERMATOLOGY
End: 2021-01-27

## 2021-01-27 DIAGNOSIS — L72.8 OTHER FOLLICULAR CYSTS OF THE SKIN AND SUBCUTANEOUS TISSUE: ICD-10-CM

## 2021-01-27 DIAGNOSIS — L70.0 ACNE VULGARIS: ICD-10-CM

## 2021-01-27 PROBLEM — L08.89 OTHER SPECIFIED LOCAL INFECTIONS OF THE SKIN AND SUBCUTANEOUS TISSUE: Status: ACTIVE | Noted: 2021-01-27

## 2021-01-27 PROCEDURE — OTHER INTRALESIONAL KENALOG: OTHER

## 2021-01-27 PROCEDURE — 11900 INJECT SKIN LESIONS </W 7: CPT

## 2021-01-27 PROCEDURE — OTHER PRESCRIPTION: OTHER

## 2021-01-27 PROCEDURE — 99214 OFFICE O/P EST MOD 30 MIN: CPT | Mod: 25

## 2021-01-27 PROCEDURE — OTHER REASSURANCE: OTHER

## 2021-01-27 PROCEDURE — OTHER COUNSELING: OTHER

## 2021-01-27 RX ORDER — SPIRONOLACTONE 25 MG/1
25MG TABLET, FILM COATED ORAL TWICE PER DAY
Qty: 180 | Refills: 0 | Status: ERX | COMMUNITY
Start: 2021-01-27

## 2021-01-27 RX ORDER — DOXYCYCLINE 100 MG/1
CAPSULE ORAL
Qty: 20 | Refills: 0 | Status: ERX | COMMUNITY
Start: 2021-01-27

## 2021-01-27 ASSESSMENT — LOCATION SIMPLE DESCRIPTION DERM
LOCATION SIMPLE: LEFT CHEEK
LOCATION SIMPLE: RIGHT CHEEK

## 2021-01-27 ASSESSMENT — LOCATION ZONE DERM: LOCATION ZONE: FACE

## 2021-01-27 ASSESSMENT — LOCATION DETAILED DESCRIPTION DERM
LOCATION DETAILED: RIGHT SUPERIOR MEDIAL BUCCAL CHEEK
LOCATION DETAILED: LEFT CENTRAL MALAR CHEEK

## 2021-01-27 NOTE — PROCEDURE: COUNSELING
Bactrim Counseling:  I discussed with the patient the risks of sulfa antibiotics including but not limited to GI upset, allergic reaction, drug rash, diarrhea, dizziness, photosensitivity, and yeast infections.  Rarely, more serious reactions can occur including but not limited to aplastic anemia, agranulocytosis, methemoglobinemia, blood dyscrasias, liver or kidney failure, lung infiltrates or desquamative/blistering drug rashes.
Minocycline Pregnancy And Lactation Text: This medication is Pregnancy Category D and not consider safe during pregnancy. It is also excreted in breast milk.
High Dose Vitamin A Counseling: Side effects reviewed, pt to contact office should one occur.
Topical Sulfur Applications Counseling: Topical Sulfur Counseling: Patient counseled that this medication may cause skin irritation or allergic reactions.  In the event of skin irritation, the patient was advised to reduce the amount of the drug applied or use it less frequently.   The patient verbalized understanding of the proper use and possible adverse effects of topical sulfur application.  All of the patient's questions and concerns were addressed.
Detail Level: Zone
Detail Level: Detailed
Minocycline Counseling: Patient advised regarding possible photosensitivity and discoloration of the teeth, skin, lips, tongue and gums.  Patient instructed to avoid sunlight, if possible.  When exposed to sunlight, patients should wear protective clothing, sunglasses, and sunscreen.  The patient was instructed to call the office immediately if the following severe adverse effects occur:  hearing changes, easy bruising/bleeding, severe headache, or vision changes.  The patient verbalized understanding of the proper use and possible adverse effects of minocycline.  All of the patient's questions and concerns were addressed.
Doxycycline Counseling:  Patient counseled regarding possible photosensitivity and increased risk for sunburn.  Patient instructed to avoid sunlight, if possible.  When exposed to sunlight, patients should wear protective clothing, sunglasses, and sunscreen.  The patient was instructed to call the office immediately if the following severe adverse effects occur:  hearing changes, easy bruising/bleeding, severe headache, or vision changes.  The patient verbalized understanding of the proper use and possible adverse effects of doxycycline.  All of the patient's questions and concerns were addressed.
Include Pregnancy/Lactation Warning?: No
Doxycycline Pregnancy And Lactation Text: This medication is Pregnancy Category D and not consider safe during pregnancy. It is also excreted in breast milk but is considered safe for shorter treatment courses.
High Dose Vitamin A Pregnancy And Lactation Text: High dose vitamin A therapy is contraindicated during pregnancy and breast feeding.
Isotretinoin Pregnancy And Lactation Text: This medication is Pregnancy Category X and is considered extremely dangerous during pregnancy. It is unknown if it is excreted in breast milk.
Spironolactone Pregnancy And Lactation Text: This medication can cause feminization of the male fetus and should be avoided during pregnancy. The active metabolite is also found in breast milk.
Bactrim Pregnancy And Lactation Text: This medication is Pregnancy Category D and is known to cause fetal risk.  It is also excreted in breast milk.
Erythromycin Counseling:  I discussed with the patient the risks of erythromycin including but not limited to GI upset, allergic reaction, drug rash, diarrhea, increase in liver enzymes, and yeast infections.
Topical Sulfur Applications Pregnancy And Lactation Text: This medication is Pregnancy Category C and has an unknown safety profile during pregnancy. It is unknown if this topical medication is excreted in breast milk.
Tazorac Counseling:  Patient advised that medication is irritating and drying.  Patient may need to apply sparingly and wash off after an hour before eventually leaving it on overnight.  The patient verbalized understanding of the proper use and possible adverse effects of tazorac.  All of the patient's questions and concerns were addressed.
Topical Clindamycin Counseling: Patient counseled that this medication may cause skin irritation or allergic reactions.  In the event of skin irritation, the patient was advised to reduce the amount of the drug applied or use it less frequently.   The patient verbalized understanding of the proper use and possible adverse effects of clindamycin.  All of the patient's questions and concerns were addressed.
Benzoyl Peroxide Pregnancy And Lactation Text: This medication is Pregnancy Category C. It is unknown if benzoyl peroxide is excreted in breast milk.
Birth Control Pills Counseling: Birth Control Pill Counseling: I discussed with the patient the potential side effects of OCPs including but not limited to increased risk of stroke, heart attack, thrombophlebitis, deep venous thrombosis, hepatic adenomas, breast changes, GI upset, headaches, and depression.  The patient verbalized understanding of the proper use and possible adverse effects of OCPs. All of the patient's questions and concerns were addressed.
Tazorac Pregnancy And Lactation Text: This medication is not safe during pregnancy. It is unknown if this medication is excreted in breast milk.
Birth Control Pills Pregnancy And Lactation Text: This medication should be avoided if pregnant and for the first 30 days post-partum.
Azithromycin Pregnancy And Lactation Text: This medication is considered safe during pregnancy and is also secreted in breast milk.
Benzoyl Peroxide Counseling: Patient counseled that medicine may cause skin irritation and bleach clothing.  In the event of skin irritation, the patient was advised to reduce the amount of the drug applied or use it less frequently.   The patient verbalized understanding of the proper use and possible adverse effects of benzoyl peroxide.  All of the patient's questions and concerns were addressed.
Topical Retinoid counseling:  Patient advised to apply a pea-sized amount only at bedtime and wait 30 minutes after washing their face before applying.  If too drying, patient may add a non-comedogenic moisturizer. The patient verbalized understanding of the proper use and possible adverse effects of retinoids.  All of the patient's questions and concerns were addressed.
Dapsone Counseling: I discussed with the patient the risks of dapsone including but not limited to hemolytic anemia, agranulocytosis, rashes, methemoglobinemia, kidney failure, peripheral neuropathy, headaches, GI upset, and liver toxicity.  Patients who start dapsone require monitoring including baseline LFTs and weekly CBCs for the first month, then every month thereafter.  The patient verbalized understanding of the proper use and possible adverse effects of dapsone.  All of the patient's questions and concerns were addressed.
Topical Retinoid Pregnancy And Lactation Text: This medication is Pregnancy Category C. It is unknown if this medication is excreted in breast milk.
Patient Specific Counseling (Will Not Stick From Patient To Patient): - Begin spironolactone 25mg. \\n- Begin by taking 1 tablet once every morning for 1 week, and if well tolerated increase to 1 tablet twice per day.
Tetracycline Counseling: Patient counseled regarding possible photosensitivity and increased risk for sunburn.  Patient instructed to avoid sunlight, if possible.  When exposed to sunlight, patients should wear protective clothing, sunglasses, and sunscreen.  The patient was instructed to call the office immediately if the following severe adverse effects occur:  hearing changes, easy bruising/bleeding, severe headache, or vision changes.  The patient verbalized understanding of the proper use and possible adverse effects of tetracycline.  All of the patient's questions and concerns were addressed. Patient understands to avoid pregnancy while on therapy due to potential birth defects.
Topical Clindamycin Pregnancy And Lactation Text: This medication is Pregnancy Category B and is considered safe during pregnancy. It is unknown if it is excreted in breast milk.
Dapsone Pregnancy And Lactation Text: This medication is Pregnancy Category C and is not considered safe during pregnancy or breast feeding.
Spironolactone Counseling: Patient advised regarding risks of diarrhea, abdominal pain, hyperkalemia, birth defects (for female patients), liver toxicity and renal toxicity. The patient may need blood work to monitor liver and kidney function and potassium levels while on therapy. The patient verbalized understanding of the proper use and possible adverse effects of spironolactone.  All of the patient's questions and concerns were addressed.
Isotretinoin Counseling: Patient should get monthly blood tests, not donate blood, not drive at night if vision affected, not share medication, and not undergo elective surgery for 6 months after tx completed. Side effects reviewed, pt to contact office should one occur.
Azithromycin Counseling:  I discussed with the patient the risks of azithromycin including but not limited to GI upset, allergic reaction, drug rash, diarrhea, and yeast infections.
Erythromycin Pregnancy And Lactation Text: This medication is Pregnancy Category B and is considered safe during pregnancy. It is also excreted in breast milk.

## 2021-01-27 NOTE — PROCEDURE: REASSURANCE
Detail Level: Detailed
Hide Additional Notes?: No
Additional Notes (Optional): Discussed impetigo is possible but does not suggest cellulitis. Recommended empiric doxycycline while culture is pending. Recommended avoiding popping pimples and cysts in future. Patient expressed understanding.

## 2021-05-01 ENCOUNTER — APPOINTMENT (OUTPATIENT)
Dept: CT IMAGING | Facility: CLINIC | Age: 31
End: 2021-05-01
Attending: EMERGENCY MEDICINE
Payer: COMMERCIAL

## 2021-05-01 ENCOUNTER — HOSPITAL ENCOUNTER (EMERGENCY)
Facility: CLINIC | Age: 31
Discharge: HOME | End: 2021-05-02
Attending: EMERGENCY MEDICINE | Admitting: EMERGENCY MEDICINE
Payer: COMMERCIAL

## 2021-05-01 DIAGNOSIS — N12 PYELONEPHRITIS: ICD-10-CM

## 2021-05-01 DIAGNOSIS — R51.9 NONINTRACTABLE HEADACHE, UNSPECIFIED CHRONICITY PATTERN, UNSPECIFIED HEADACHE TYPE: ICD-10-CM

## 2021-05-01 LAB
ANION GAP SERPL CALCULATED.3IONS-SCNC: 4 MMOL/L (ref 3–14)
BASOPHILS # BLD AUTO: 0 10E9/L (ref 0–0.2)
BASOPHILS NFR BLD AUTO: 0.4 %
BUN SERPL-MCNC: 16 MG/DL (ref 7–30)
CALCIUM SERPL-MCNC: 8.5 MG/DL (ref 8.5–10.1)
CHLORIDE SERPL-SCNC: 110 MMOL/L (ref 94–109)
CO2 SERPL-SCNC: 25 MMOL/L (ref 20–32)
CREAT SERPL-MCNC: 1.01 MG/DL (ref 0.52–1.04)
DIFFERENTIAL METHOD BLD: ABNORMAL
EOSINOPHIL # BLD AUTO: 0.2 10E9/L (ref 0–0.7)
EOSINOPHIL NFR BLD AUTO: 2.4 %
ERYTHROCYTE [DISTWIDTH] IN BLOOD BY AUTOMATED COUNT: 13.1 % (ref 10–15)
GFR SERPL CREATININE-BSD FRML MDRD: 75 ML/MIN/{1.73_M2}
GLUCOSE SERPL-MCNC: 90 MG/DL (ref 70–99)
HCG SERPL QL: NEGATIVE
HCT VFR BLD AUTO: 38.8 % (ref 35–47)
HGB BLD-MCNC: 12.2 G/DL (ref 11.7–15.7)
IMM GRANULOCYTES # BLD: 0 10E9/L (ref 0–0.4)
IMM GRANULOCYTES NFR BLD: 0.4 %
LYMPHOCYTES # BLD AUTO: 2.3 10E9/L (ref 0.8–5.3)
LYMPHOCYTES NFR BLD AUTO: 30.2 %
MCH RBC QN AUTO: 30 PG (ref 26.5–33)
MCHC RBC AUTO-ENTMCNC: 31.4 G/DL (ref 31.5–36.5)
MCV RBC AUTO: 95 FL (ref 78–100)
MONOCYTES # BLD AUTO: 0.6 10E9/L (ref 0–1.3)
MONOCYTES NFR BLD AUTO: 7.9 %
NEUTROPHILS # BLD AUTO: 4.4 10E9/L (ref 1.6–8.3)
NEUTROPHILS NFR BLD AUTO: 58.7 %
NRBC # BLD AUTO: 0 10*3/UL
NRBC BLD AUTO-RTO: 0 /100
PLATELET # BLD AUTO: 267 10E9/L (ref 150–450)
POTASSIUM SERPL-SCNC: 3.5 MMOL/L (ref 3.4–5.3)
RBC # BLD AUTO: 4.07 10E12/L (ref 3.8–5.2)
SODIUM SERPL-SCNC: 139 MMOL/L (ref 133–144)
WBC # BLD AUTO: 7.5 10E9/L (ref 4–11)

## 2021-05-01 PROCEDURE — 85025 COMPLETE CBC W/AUTO DIFF WBC: CPT | Performed by: EMERGENCY MEDICINE

## 2021-05-01 PROCEDURE — 96375 TX/PRO/DX INJ NEW DRUG ADDON: CPT

## 2021-05-01 PROCEDURE — 96361 HYDRATE IV INFUSION ADD-ON: CPT

## 2021-05-01 PROCEDURE — 250N000011 HC RX IP 250 OP 636: Performed by: EMERGENCY MEDICINE

## 2021-05-01 PROCEDURE — 84703 CHORIONIC GONADOTROPIN ASSAY: CPT | Performed by: EMERGENCY MEDICINE

## 2021-05-01 PROCEDURE — 80048 BASIC METABOLIC PNL TOTAL CA: CPT | Performed by: EMERGENCY MEDICINE

## 2021-05-01 PROCEDURE — 250N000009 HC RX 250: Performed by: EMERGENCY MEDICINE

## 2021-05-01 PROCEDURE — 258N000003 HC RX IP 258 OP 636: Performed by: EMERGENCY MEDICINE

## 2021-05-01 PROCEDURE — 99284 EMERGENCY DEPT VISIT MOD MDM: CPT | Mod: 25

## 2021-05-01 PROCEDURE — 70450 CT HEAD/BRAIN W/O DYE: CPT

## 2021-05-01 RX ORDER — LIDOCAINE HYDROCHLORIDE 40 MG/ML
1 INJECTION, SOLUTION RETROBULBAR ONCE
Status: COMPLETED | OUTPATIENT
Start: 2021-05-01 | End: 2021-05-01

## 2021-05-01 RX ORDER — DIPHENHYDRAMINE HYDROCHLORIDE 50 MG/ML
50 INJECTION INTRAMUSCULAR; INTRAVENOUS ONCE
Status: COMPLETED | OUTPATIENT
Start: 2021-05-01 | End: 2021-05-01

## 2021-05-01 RX ORDER — SODIUM CHLORIDE 9 MG/ML
INJECTION, SOLUTION INTRAVENOUS CONTINUOUS
Status: DISCONTINUED | OUTPATIENT
Start: 2021-05-01 | End: 2021-05-02 | Stop reason: HOSPADM

## 2021-05-01 RX ADMIN — DIPHENHYDRAMINE HYDROCHLORIDE 50 MG: 50 INJECTION, SOLUTION INTRAMUSCULAR; INTRAVENOUS at 23:01

## 2021-05-01 RX ADMIN — PROCHLORPERAZINE EDISYLATE 10 MG: 5 INJECTION INTRAMUSCULAR; INTRAVENOUS at 23:02

## 2021-05-01 RX ADMIN — SODIUM CHLORIDE 1000 ML: 9 INJECTION, SOLUTION INTRAVENOUS at 23:01

## 2021-05-01 RX ADMIN — LIDOCAINE HYDROCHLORIDE 1 ML: 40 INJECTION, SOLUTION RETROBULBAR; TOPICAL at 23:03

## 2021-05-01 ASSESSMENT — ENCOUNTER SYMPTOMS
HEADACHES: 1
PHOTOPHOBIA: 1

## 2021-05-02 VITALS
HEART RATE: 74 BPM | OXYGEN SATURATION: 96 % | DIASTOLIC BLOOD PRESSURE: 54 MMHG | TEMPERATURE: 99.1 F | RESPIRATION RATE: 20 BRPM | SYSTOLIC BLOOD PRESSURE: 98 MMHG

## 2021-05-02 LAB
ALBUMIN UR-MCNC: 20 MG/DL
APPEARANCE UR: CLEAR
BACTERIA #/AREA URNS HPF: ABNORMAL /HPF
BILIRUB UR QL STRIP: NEGATIVE
COLOR UR AUTO: YELLOW
GLUCOSE UR STRIP-MCNC: NEGATIVE MG/DL
HGB UR QL STRIP: NEGATIVE
KETONES UR STRIP-MCNC: 10 MG/DL
LEUKOCYTE ESTERASE UR QL STRIP: ABNORMAL
MUCOUS THREADS #/AREA URNS LPF: PRESENT /LPF
NITRATE UR QL: POSITIVE
PH UR STRIP: 5.5 PH (ref 5–7)
RBC #/AREA URNS AUTO: 3 /HPF (ref 0–2)
SOURCE: ABNORMAL
SP GR UR STRIP: 1.03 (ref 1–1.03)
SQUAMOUS #/AREA URNS AUTO: 8 /HPF (ref 0–1)
UROBILINOGEN UR STRIP-MCNC: NORMAL MG/DL (ref 0–2)
WBC #/AREA URNS AUTO: 17 /HPF (ref 0–5)

## 2021-05-02 PROCEDURE — 250N000011 HC RX IP 250 OP 636: Performed by: EMERGENCY MEDICINE

## 2021-05-02 PROCEDURE — 87086 URINE CULTURE/COLONY COUNT: CPT | Performed by: EMERGENCY MEDICINE

## 2021-05-02 PROCEDURE — 96365 THER/PROPH/DIAG IV INF INIT: CPT

## 2021-05-02 PROCEDURE — 96375 TX/PRO/DX INJ NEW DRUG ADDON: CPT

## 2021-05-02 PROCEDURE — 81001 URINALYSIS AUTO W/SCOPE: CPT | Performed by: EMERGENCY MEDICINE

## 2021-05-02 PROCEDURE — 87186 SC STD MICRODIL/AGAR DIL: CPT | Performed by: EMERGENCY MEDICINE

## 2021-05-02 PROCEDURE — 87088 URINE BACTERIA CULTURE: CPT | Performed by: EMERGENCY MEDICINE

## 2021-05-02 RX ORDER — ONDANSETRON 4 MG/1
4 TABLET, ORALLY DISINTEGRATING ORAL EVERY 8 HOURS PRN
Qty: 10 TABLET | Refills: 0 | Status: SHIPPED | OUTPATIENT
Start: 2021-05-02 | End: 2021-05-05

## 2021-05-02 RX ORDER — CIPROFLOXACIN 500 MG/1
500 TABLET, FILM COATED ORAL 2 TIMES DAILY
Qty: 14 TABLET | Refills: 0 | Status: SHIPPED | OUTPATIENT
Start: 2021-05-02 | End: 2021-05-09

## 2021-05-02 RX ORDER — CEFTRIAXONE 1 G/1
1 INJECTION, POWDER, FOR SOLUTION INTRAMUSCULAR; INTRAVENOUS ONCE
Status: COMPLETED | OUTPATIENT
Start: 2021-05-02 | End: 2021-05-02

## 2021-05-02 RX ORDER — KETOROLAC TROMETHAMINE 15 MG/ML
10 INJECTION, SOLUTION INTRAMUSCULAR; INTRAVENOUS ONCE
Status: COMPLETED | OUTPATIENT
Start: 2021-05-02 | End: 2021-05-02

## 2021-05-02 RX ADMIN — KETOROLAC TROMETHAMINE 10 MG: 15 INJECTION, SOLUTION INTRAMUSCULAR; INTRAVENOUS at 01:27

## 2021-05-02 RX ADMIN — CEFTRIAXONE 1 G: 1 INJECTION, POWDER, FOR SOLUTION INTRAMUSCULAR; INTRAVENOUS at 02:02

## 2021-05-02 NOTE — ED TRIAGE NOTES
"Pt states headache with photophobia earlier today. Pt states feels similar to \"before when it was my liver\". Pt denies hx of migraines. ABCs intact GCS 15  "

## 2021-05-02 NOTE — DISCHARGE INSTRUCTIONS
Discharge Instructions  Headache    You were seen today for a headache. Headaches may be caused by many different things such as muscle tension, sinus inflammation, anxiety and stress, having too little sleep, too much alcohol, some medical conditions or injury. You may have a migraine, which is caused by changes in the blood vessels in your head.  At this time your provider does not find that your headache is a sign of anything dangerous or life-threatening.  However, sometimes the signs of serious illness do not show up right away.      Generally, every Emergency Department visit should have a follow-up clinic visit with either a primary or a specialty clinic/provider. Please follow-up as instructed by your emergency provider today.    Return to the Emergency Department if:  You get a new fever of 100.4 F or higher.  Your headache gets much worse.  You get a stiff neck with your headache.  You get a new headache that is significantly different or worse than headaches you have had before.  You are vomiting (throwing up) and cannot keep food or water down.  You have blurry or double vision or other problems with your eyes.  You have a new weakness on one side of your body.  You have difficulty with balance which is new.  You or your family thinks you are confused.  You have a seizure.    What can I do to help myself?  Pain medications - You may take a pain medication such as Tylenol  (acetaminophen), Advil , Motrin  (ibuprofen) or Aleve  (naproxen).  Take a pain reliever as soon as you notice symptoms.  Starting medications as soon as you start to have symptoms may lessen the amount of pain you have.  Relaxing in a quiet, dark room may help.  Get enough sleep and eat meals regularly.  You may need to watch for certain foods or other things which may trigger your headaches.  Keeping a journal of your headaches and possible triggers may help you and your primary provider to identify things which you should avoid which  may be causing your headaches.  If you were given a prescription for medicine here today, be sure to read all of the information (including the package insert) that comes with your prescription.  This will include important information about the medicine, its side effects, and any warnings that you need to know about.  The pharmacist who fills the prescription can provide more information and answer questions you may have about the medicine.  If you have questions or concerns that the pharmacist cannot address, please call or return to the Emergency Department.   Remember that you can always come back to the Emergency Department if you are not able to see your regular provider in the amount of time listed above, if you get any new symptoms, or if there is anything that worries you.  Discharge Instructions  Urinary Tract Infection  You or your child have been diagnosed with a urinary tract infection, or UTI. The urinary tract includes the kidneys (which make urine/pee), ureters (the tubes that carry urine/pee from the kidneys to the bladder), the bladder (which stores urine/pee), and urethra (the tube that carries urine/pee out of the bladder). Urinary tract infections occur when bacteria travel up the urethra into the bladder (bladder infection) and, in some cases, from there into the kidneys (kidney infection).  Generally, every Emergency Department visit should have a follow-up clinic visit with either a primary or a specialty clinic/provider. Please follow-up as instructed by your emergency provider today.  Return to the Emergency Department if:  You or your child have severe back pain.  You or your child are vomiting (throwing up) so that you cannot take your medicine.  You or your child have a new fever (had not previously had a fever) over 101 F.  You or your child have confusion or are very weak, or feel very ill.  Your child seems much more ill, will not wake up, will not respond right, or is crying for a  long time and will not calm down.  You or your child are showing signs of dehydration. These signs may include decreased urination (pee), dry mouth/gums/tongue, or decreased activity.    Follow-up with your provider:   Children under 24 months need to be seen by their regular provider within one week after a diagnosis of a UTI. It may be necessary to do some more tests to look at the child s kidney or bladder.  You should begin to feel better within 24 - 48 hours of starting your antibiotic; follow-up with your regular clinic/doctor/provider if this is not the case.    Treatment:   You will be treated with an antibiotic to kill the bacteria. We have to make an educated guess, based on what we know about common bacteria and antibiotics, as to which antibiotic will work for your infection. We will be correct most times but there will be some cases where the antibiotic chosen is not correct (see urine cultures below).  Take a pain medication such as acetaminophen (Tylenol ) or ibuprofen (Advil , Motrin , Nuprin ).  Phenazopyridine (Pyridium , Uristat ) is a prescription medication that numbs the bladder to reduce the burning pain of some UTIs.  The same medication is available in a non-prescription version (Azo-Standard , Urodol ). This medication will change the color of the urine and tears (usually blue or orange). If you wear contacts, do not wear them while taking this medication as they may be stained by the medication.    Urine Cultures:  If indicated, a urine culture may have been performed today. This test generally takes 24-48 hours to complete so the results are not known at this time. The results can confirm that an infection is present but also determine which antibiotic is effective for the specific bacteria that is causing the infection. If your urine culture shows that the antibiotic you were given today will not work to treat your infection, we will attempt to contact you to make arrangements to change  "the antibiotic. If the culture confirms that the antibiotic is effective for your infection, you will not be contacted. We often recommend follow-up with your regular physician/provider on the culture results regardless of this process.    Antibiotic Warning:   If you have been placed on antibiotics - watch for signs of allergic reaction.  These include rash, lip swelling, difficulty breathing, wheezing, and dizziness.  If you develop any of these symptoms, stop the antibiotic immediately and go to an emergency room or urgent care for evaluation.    Probiotics: If you have been given an antibiotic, you may want to also take a probiotic pill or eat yogurt with live cultures. Probiotics have \"good bacteria\" to help your intestines stay healthy. Studies have shown that probiotics help prevent diarrhea and other intestine problems (including C. diff infection) when you take antibiotics. You can buy these without a prescription in the pharmacy section of the store.   If you were given a prescription for medicine here today, be sure to read all of the information (including the package insert) that comes with your prescription.  This will include important information about the medicine, its side effects, and any warnings that you need to know about.  The pharmacist who fills the prescription can provide more information and answer questions you may have about the medicine.  If you have questions or concerns that the pharmacist cannot address, please call or return to the Emergency Department.   Remember that you can always come back to the Emergency Department if you are not able to see your regular provider in the amount of time listed above, if you get any new symptoms, or if there is anything that worries you.    "

## 2021-05-02 NOTE — ED PROVIDER NOTES
History   Chief Complaint:  Headache       HPI   Evita Cornelius is a 31 year old female who presents with headache. The patient stated that she has developed a headache with photophobia earlier today around 3 hours ago and gradually became worse. She described it as constant pain behind her eyes. She stated that she experienced that before when she had problems with her liver. She denies fever, nausea, vomiting, loss of taste or smell. No alcohol use tonight.       Review of Systems   Eyes: Positive for photophobia.   Neurological: Positive for headaches.     Allergies:  The patient has no known allergies.     Medications:  Levothyroxine     Past Medical History:    Major depression  Bipolar disorder  Anxiety  GERD    Past Surgical History:    C foot surgery    Family History:    Hypertension  mother  Diabetes Mother  Depression Mother  Depression Father    Social History:  The patient was brought to the emergency department by private vehicle.  The patient presents to the emergency department alone.    Physical Exam     Patient Vitals for the past 24 hrs:   BP Temp Temp src Pulse Resp SpO2   05/02/21 0230 -- -- -- -- -- 96 %   05/02/21 0215 98/54 -- -- 74 -- 96 %   05/02/21 0200 106/55 -- -- 73 -- 96 %   05/02/21 0145 99/60 -- -- 71 -- 96 %   05/02/21 0130 100/57 -- -- 68 -- 96 %   05/02/21 0015 101/58 -- -- -- -- 96 %   05/02/21 0010 101/58 -- -- 79 -- 95 %   05/01/21 2300 -- -- -- -- -- 95 %   05/01/21 2245 -- -- -- -- -- 98 %   05/01/21 2230 -- -- -- -- -- 97 %   05/01/21 2106 129/65 99.1  F (37.3  C) Oral 110 20 98 %       Physical Exam   Constitutional:  Oriented to person, place, and time. Minor distress.  HENT:   Head:    Normocephalic.   Mouth/Throat:   Oropharynx is clear and moist.   Eyes:    EOM are normal. Pupils are equal, round, and reactive to light.   Neck:    Neck supple.   Cardiovascular:  Normal rate, regular rhythm and normal heart sounds.      Exam reveals no gallop and no friction rub.        No murmur heard.  Pulmonary/Chest:  Effort normal and breath sounds normal.      No respiratory distress. No wheezes. No rales.      No reproducible chest wall pain.  Abdominal:   Soft. No distension. No tenderness. No rebound and no guarding. No pain on percussion of her flanks.   Musculoskeletal:  Normal range of motion.   Neurological:   Alert and oriented to person, place, and time.           Moves all 4 extremities spontaneously. Cranial nerves intact, no meningeal signs.  Skin:    No rash noted. No pallor.       Emergency Department Course     Imaging:    CT Head w/o Contrast  1.  No acute intracranial process.      Laboratory:    CBC: WBC 7.5, HGB 12.2,      HCG Qualitative Urine: negative    Urine Culture Aerobic Bacterial: In process    UA with micro: ketones 10 (A), albumin 20 (A), nitrite positive (A), Leukocyte esterase moderate (A), WBC/HPF 17 (H), RBC/HPF 3 (H), Bacteria many (A), squamous epithelial 8 (H), mucous present (A), o/w negative    BMP: Chloride 110 (H) (Creatinine 1.01) o/w WNL      Emergency Department Course:    Reviewed:  I reviewed nursing notes, vitals, past medical history and care everywhere    Assessments/Consults  ED Course as of May 02 0154   Sat May 01, 2021   2239 I obtained history and examined the patient as noted above.      Sun May 02, 2021   0143 I reassessed the patient and explained findings. The patient feels better after the interventions.           Interventions:  2301 NS 1000 mL IV  2303 Lidocaine 1 mL nasal  2301 Benadryl 50 mg IV  2302 Compazine 10 mg IV  0127 Toradol 10 mg IV  0202 Rocephin 1 g IV      Disposition:  The patient was discharged to home.       Impression & Plan     Medical Decision Making:  Ms Cornelius is a 30 yo female that came in complaining of headache. Per patient, similar presentation happened with prior Pyelonephritis. Differential includes migraine, meningitis, subarachnoid hemorrhage or other causes. With severity of symptoms,  headache of less than 6 hours, CT was done and fortunately was negative for subarachnoid bleeding. Her urine is clearly positive for pyuria with her complain of back pain it is consistent with  Pyelonephritis.Her symptoms improved after the interventions. Therefore I would highly doubt meningitis, I do not believe it is in her interest to a lumbar puncture. She will be discharged home with a course of antibiotics and told to follow up with a primary doctor. She should return with any worsening heartache, nausea, vomiting or any other symptoms or concerns.       Diagnosis:    ICD-10-CM    1. Pyelonephritis  N12 Urine Culture Aerobic Bacterial   2. Nonintractable headache, unspecified chronicity pattern, unspecified headache type  R51.9        Discharge Medications:  Discharge Medication List as of 5/2/2021  2:30 AM      START taking these medications    Details   ciprofloxacin (CIPRO) 500 MG tablet Take 1 tablet (500 mg) by mouth 2 times daily for 7 days, Disp-14 tablet, R-0, Local Print      ondansetron (ZOFRAN ODT) 4 MG ODT tab Take 1 tablet (4 mg) by mouth every 8 hours as needed, Disp-10 tablet, R-0, Local Print             Scribe Disclosure:  I, Clarence Haro, am serving as a scribe at 10:20 PM on 5/1/2021 to document services personally performed by Bowen Schrader MD based on my observations and the provider's statements to me.              Bowen Schrader MD  05/03/21 [id number]

## 2021-05-02 NOTE — ED NOTES
Patient remains very sleepy, mumbling words. Answers appropriately to her name and the blood pressure cuff hurts.     Answers no to headache. Eyes shut.     Attempted to obtain urine, patient only mumbling to answer

## 2021-05-03 LAB
BACTERIA SPEC CULT: ABNORMAL
BACTERIA SPEC CULT: ABNORMAL
Lab: ABNORMAL
SPECIMEN SOURCE: ABNORMAL

## 2021-05-27 NOTE — ED AVS SNAPSHOT
[facility], [place], Emergency Department  [address]  [facility] [address]  Phone:  [phone]  Fax:  [phone]                                    Evita Cornelius   MRN: [id number]    Department:  [facility], Emergency Department   Date of Visit:  3/18/2020           After Visit Summary Signature Page    I have received my discharge instructions, and my questions have been answered. I have discussed any challenges I see with this plan with the nurse or doctor.    ..........................................................................................................................................  Patient/Patient Representative Signature      ..........................................................................................................................................  Patient Representative Print Name and Relationship to Patient    ..................................................               ................................................  Date                                   Time    ..........................................................................................................................................  Reviewed by Signature/Title    ...................................................              ..............................................  Date                                               Time          22EPIC Rev 08/18        27-May-2021 19:40

## 2021-09-07 NOTE — NURSING NOTE
"Chief Complaint   Patient presents with     Recheck Medication       Initial BP 92/62 (BP Location: Left arm, Patient Position: Chair, Cuff Size: Adult Large)  Pulse 76  Temp 98  F (36.7  C) (Temporal)  Resp 18  Wt 173 lb 9.6 oz (78.7 kg)  SpO2 98%  BMI 28.78 kg/m2 Estimated body mass index is 28.78 kg/(m^2) as calculated from the following:    Height as of 3/1/17: 5' 5.12\" (1.654 m).    Weight as of this encounter: 173 lb 9.6 oz (78.7 kg).  Medication Reconciliation: complete   Jeanne Pollock CMA      "
Negative

## 2021-09-25 ENCOUNTER — HEALTH MAINTENANCE LETTER (OUTPATIENT)
Age: 31
End: 2021-09-25

## 2021-11-23 ENCOUNTER — HOSPITAL ENCOUNTER (INPATIENT)
Facility: CLINIC | Age: 31
LOS: 1 days | Discharge: LEFT BEFORE DISCHARGE | End: 2021-11-24
Attending: EMERGENCY MEDICINE | Admitting: PSYCHIATRY & NEUROLOGY
Payer: COMMERCIAL

## 2021-11-23 ENCOUNTER — TELEPHONE (OUTPATIENT)
Dept: BEHAVIORAL HEALTH | Facility: CLINIC | Age: 31
End: 2021-11-23

## 2021-11-23 DIAGNOSIS — Z11.52 ENCOUNTER FOR SCREENING LABORATORY TESTING FOR SEVERE ACUTE RESPIRATORY SYNDROME CORONAVIRUS 2 (SARS-COV-2): ICD-10-CM

## 2021-11-23 DIAGNOSIS — F19.10 POLYSUBSTANCE ABUSE (H): ICD-10-CM

## 2021-11-23 DIAGNOSIS — F32.A DEPRESSION, UNSPECIFIED DEPRESSION TYPE: ICD-10-CM

## 2021-11-23 DIAGNOSIS — E03.9 HYPOTHYROIDISM, UNSPECIFIED TYPE: Primary | ICD-10-CM

## 2021-11-23 DIAGNOSIS — F33.9 EPISODE OF RECURRENT MAJOR DEPRESSIVE DISORDER, UNSPECIFIED DEPRESSION EPISODE SEVERITY (H): ICD-10-CM

## 2021-11-23 DIAGNOSIS — R45.851 SUICIDAL IDEATION: ICD-10-CM

## 2021-11-23 DIAGNOSIS — F19.10 ANTIDEPRESSANT TYPE ABUSE, CONTINUOUS (H): ICD-10-CM

## 2021-11-23 LAB
ALBUMIN SERPL-MCNC: 3.2 G/DL (ref 3.4–5)
ALP SERPL-CCNC: 70 U/L (ref 40–150)
ALT SERPL W P-5'-P-CCNC: 17 U/L (ref 0–50)
AMPHETAMINES UR QL SCN: ABNORMAL
ANION GAP SERPL CALCULATED.3IONS-SCNC: 5 MMOL/L (ref 3–14)
AST SERPL W P-5'-P-CCNC: 13 U/L (ref 0–45)
BARBITURATES UR QL: ABNORMAL
BASOPHILS # BLD AUTO: 0 10E3/UL (ref 0–0.2)
BASOPHILS NFR BLD AUTO: 1 %
BENZODIAZ UR QL: ABNORMAL
BILIRUB SERPL-MCNC: 0.4 MG/DL (ref 0.2–1.3)
BUN SERPL-MCNC: 11 MG/DL (ref 7–30)
CALCIUM SERPL-MCNC: 8.4 MG/DL (ref 8.5–10.1)
CANNABINOIDS UR QL SCN: ABNORMAL
CHLORIDE BLD-SCNC: 109 MMOL/L (ref 94–109)
CO2 SERPL-SCNC: 24 MMOL/L (ref 20–32)
COCAINE UR QL: ABNORMAL
CREAT SERPL-MCNC: 0.79 MG/DL (ref 0.52–1.04)
EOSINOPHIL # BLD AUTO: 0.4 10E3/UL (ref 0–0.7)
EOSINOPHIL NFR BLD AUTO: 5 %
ERYTHROCYTE [DISTWIDTH] IN BLOOD BY AUTOMATED COUNT: 13.3 % (ref 10–15)
GFR SERPL CREATININE-BSD FRML MDRD: >90 ML/MIN/1.73M2
GLUCOSE BLD-MCNC: 95 MG/DL (ref 70–99)
HCG UR QL: NEGATIVE
HCT VFR BLD AUTO: 41.8 % (ref 35–47)
HGB BLD-MCNC: 13.4 G/DL (ref 11.7–15.7)
IMM GRANULOCYTES # BLD: 0 10E3/UL
IMM GRANULOCYTES NFR BLD: 0 %
LYMPHOCYTES # BLD AUTO: 1.8 10E3/UL (ref 0.8–5.3)
LYMPHOCYTES NFR BLD AUTO: 27 %
MCH RBC QN AUTO: 30 PG (ref 26.5–33)
MCHC RBC AUTO-ENTMCNC: 32.1 G/DL (ref 31.5–36.5)
MCV RBC AUTO: 94 FL (ref 78–100)
MONOCYTES # BLD AUTO: 0.5 10E3/UL (ref 0–1.3)
MONOCYTES NFR BLD AUTO: 7 %
NEUTROPHILS # BLD AUTO: 4.1 10E3/UL (ref 1.6–8.3)
NEUTROPHILS NFR BLD AUTO: 60 %
NRBC # BLD AUTO: 0 10E3/UL
NRBC BLD AUTO-RTO: 0 /100
OPIATES UR QL SCN: ABNORMAL
PLATELET # BLD AUTO: 242 10E3/UL (ref 150–450)
POTASSIUM BLD-SCNC: 3.9 MMOL/L (ref 3.4–5.3)
PROT SERPL-MCNC: 7.2 G/DL (ref 6.8–8.8)
RBC # BLD AUTO: 4.46 10E6/UL (ref 3.8–5.2)
SARS-COV-2 RNA RESP QL NAA+PROBE: NEGATIVE
SODIUM SERPL-SCNC: 138 MMOL/L (ref 133–144)
TSH SERPL DL<=0.005 MIU/L-ACNC: 98.48 MU/L (ref 0.4–4)
WBC # BLD AUTO: 6.8 10E3/UL (ref 4–11)

## 2021-11-23 PROCEDURE — 124N000002 HC R&B MH UMMC

## 2021-11-23 PROCEDURE — 81001 URINALYSIS AUTO W/SCOPE: CPT | Performed by: PSYCHIATRY & NEUROLOGY

## 2021-11-23 PROCEDURE — 84443 ASSAY THYROID STIM HORMONE: CPT | Performed by: EMERGENCY MEDICINE

## 2021-11-23 PROCEDURE — 36415 COLL VENOUS BLD VENIPUNCTURE: CPT | Performed by: EMERGENCY MEDICINE

## 2021-11-23 PROCEDURE — 99285 EMERGENCY DEPT VISIT HI MDM: CPT | Mod: 25 | Performed by: EMERGENCY MEDICINE

## 2021-11-23 PROCEDURE — U0005 INFEC AGEN DETEC AMPLI PROBE: HCPCS | Performed by: EMERGENCY MEDICINE

## 2021-11-23 PROCEDURE — 85025 COMPLETE CBC W/AUTO DIFF WBC: CPT | Performed by: EMERGENCY MEDICINE

## 2021-11-23 PROCEDURE — 99285 EMERGENCY DEPT VISIT HI MDM: CPT | Mod: GC | Performed by: EMERGENCY MEDICINE

## 2021-11-23 PROCEDURE — 82040 ASSAY OF SERUM ALBUMIN: CPT | Performed by: EMERGENCY MEDICINE

## 2021-11-23 PROCEDURE — 90791 PSYCH DIAGNOSTIC EVALUATION: CPT

## 2021-11-23 PROCEDURE — 80307 DRUG TEST PRSMV CHEM ANLYZR: CPT | Performed by: EMERGENCY MEDICINE

## 2021-11-23 PROCEDURE — C9803 HOPD COVID-19 SPEC COLLECT: HCPCS | Performed by: EMERGENCY MEDICINE

## 2021-11-23 PROCEDURE — 81025 URINE PREGNANCY TEST: CPT | Performed by: EMERGENCY MEDICINE

## 2021-11-23 RX ORDER — HYDROXYZINE HYDROCHLORIDE 25 MG/1
25-50 TABLET, FILM COATED ORAL EVERY 4 HOURS PRN
Status: DISCONTINUED | OUTPATIENT
Start: 2021-11-23 | End: 2021-11-24 | Stop reason: HOSPADM

## 2021-11-23 RX ORDER — OLANZAPINE 10 MG/1
10 TABLET ORAL 3 TIMES DAILY PRN
Status: DISCONTINUED | OUTPATIENT
Start: 2021-11-23 | End: 2021-11-24 | Stop reason: HOSPADM

## 2021-11-23 RX ORDER — OLANZAPINE 10 MG/2ML
10 INJECTION, POWDER, FOR SOLUTION INTRAMUSCULAR 3 TIMES DAILY PRN
Status: DISCONTINUED | OUTPATIENT
Start: 2021-11-23 | End: 2021-11-24 | Stop reason: HOSPADM

## 2021-11-23 RX ORDER — LEVOTHYROXINE SODIUM 100 UG/1
200 TABLET ORAL
Status: DISCONTINUED | OUTPATIENT
Start: 2021-11-24 | End: 2021-11-24 | Stop reason: HOSPADM

## 2021-11-23 RX ORDER — MAGNESIUM HYDROXIDE/ALUMINUM HYDROXICE/SIMETHICONE 120; 1200; 1200 MG/30ML; MG/30ML; MG/30ML
30 SUSPENSION ORAL EVERY 4 HOURS PRN
Status: DISCONTINUED | OUTPATIENT
Start: 2021-11-23 | End: 2021-11-24 | Stop reason: HOSPADM

## 2021-11-23 RX ORDER — ACETAMINOPHEN 325 MG/1
650 TABLET ORAL EVERY 4 HOURS PRN
Status: DISCONTINUED | OUTPATIENT
Start: 2021-11-23 | End: 2021-11-24 | Stop reason: HOSPADM

## 2021-11-23 RX ORDER — AMOXICILLIN 250 MG
1 CAPSULE ORAL 2 TIMES DAILY PRN
Status: DISCONTINUED | OUTPATIENT
Start: 2021-11-23 | End: 2021-11-24 | Stop reason: HOSPADM

## 2021-11-23 RX ORDER — LEVOTHYROXINE SODIUM 200 UG/1
200 TABLET ORAL
Status: ON HOLD | COMMUNITY
End: 2021-11-24

## 2021-11-23 RX ORDER — TRAZODONE HYDROCHLORIDE 50 MG/1
50 TABLET, FILM COATED ORAL
Status: DISCONTINUED | OUTPATIENT
Start: 2021-11-23 | End: 2021-11-24 | Stop reason: HOSPADM

## 2021-11-23 ASSESSMENT — ENCOUNTER SYMPTOMS
FEVER: 0
SHORTNESS OF BREATH: 0
COUGH: 0
FATIGUE: 0
SLEEP DISTURBANCE: 1

## 2021-11-23 ASSESSMENT — ACTIVITIES OF DAILY LIVING (ADL)
DRESS: INDEPENDENT
HYGIENE/GROOMING: INDEPENDENT
LAUNDRY: UNABLE TO COMPLETE
ORAL_HYGIENE: INDEPENDENT

## 2021-11-23 NOTE — ED NOTES
"11/23/2021  Evita AURE Cornelius 1990     [facility] Crisis Assessment:    Started at: 10:35am  Completed at: 11:05   Patient was assessed via in-person.  Patient Location: [facility]    Chief Complaint and History of Presenting Problem:    Patient is a 31 year old  female who presented to the ED by Self related to concerns for suicidal ideation and substance use.    Patient states, \"I don't want to live anymore.\"  She reports feeling this way for the last month. She reports a plan to take a bunch of pills which she refers to as \"M boxes\" or \"fetty percs.\"   Patient states today she was really going to do it.   Patient states she does not have them.  She clarifies having access to them.   Patient denies history of suicide attempts.  Patient states she hates waking up and wishes she never would.  She denies SIB, HI, or hallucinations.  Patient states she feels if she were not at the hospital she would kill herself and also identifies she has not acted yet.  Patient and her boyfriend's sister called around for help.  She reports a suicide line directed her to the ED for evaluation and also noted [facility] having availability if appropriate.      Patient reports being in CD treatment via telehealth M, T, TH through [facility].  She is currently on probation for aiding an offender and other drug related offenses.  She has other legal issues and court dates in January '22 where if convicted she could go to alf for a long time.  Patient states a  wants $40,000 and she is unemployed.  She reports continuing to struggle with her addiction.      Patient states she had been sober for a couple weeks and relapsed on IV on Friday.  She reports using daily, last use was yesterday.      Patient has no mental health providers and is not on any psychiatric medications.      Assessment and intervention involved meeting with pt, obtaining collateral from [facility] and [facility]where records and collaborating with " the ED treatment team, employing crisis psychotherapy including: Establishing rapport, Active listening, Apply solution-focused therapy to address current crisis, Motivational Interviewing and Brief Supportive Therapy.     Biopsychosocial Background and Demographic Information    Patient was born and raised in [place].  She reports her father has never been in her life.  She lives with her boyfriend.  Never .  She has two children, ages 10 and 8.  They are with other family members.  One is with grandparents and the other is with the child's father.  Patient is unemployed.       Mental Health History and Current Symptoms     Patient reports history of substance abuse, bipolar, depression, anxiety, and ADHD.      Mental Health History (prior psychiatric hospitalizations, civil commitments, programmatic care, etc): Patient denies any hx of mental health admissions.      Family Mental and Chemical Health History: mother and maternal grandmother have a hx of depression and anxiety.  Father's side is unknown.    Current and Historic Psychotropic Medications: none  Medication Adherent: patient denies any psychiatric medications.  She reports non-compliance with thyroid medication for a month.  Recent medication changes? off thyroid medication for a month.  No other changes.    Relevant Medical Concerns  Patient identifies concerns with completing ADLs? decreased personal cares  Patient can ambulate independently? Yes  Other medical health concerns? Yes, hypo-thyroid  History of concussion or TBI? No     Trauma History   Physical, Emotional, or Sexual abuse: No  Loss of a friend or family member to suicide: No  Other identified traumatic event or significant stressor: yes, legal issues.    Substance Use History and Treatments  Patient reports hx of methamphetamine use.  She had a couple weeks sober and relapsed on Friday.  Use is IV.  Last use yesterday.  She denies other substance abuse.  Record indicates polysubstance  "abuse.  UDS is pending.  Patient is currently in treatment via telehealth at [facility] on M, T, H from 6-9 and \"loves it.\"  Hx of treatment at [facility], and [facility].  Patient the she graduated from [facility] and did not complete the other programs.  She identifies having been court ordered to treatment previously.    Drug screen/BAL/Breathalyzer Completed? collection is pending  Results: pending.     History of Suicidal Ideation, Suicide Attempts, Non-Suicidal Self Injury, and Risk Formulation:   Details of Current Ideation, Attempt(s), Plan(s): Yes.  Patient reports suicidal ideation x 1 month with plan to overdose of \"M Boxes\" or \"fetty percs\".  Patient denies having them and states she knows how to get them.  Patient states today she was really going to do it.   Risk factors:  access to lethal means, helplessness/hopelessness, recent legal concerns  and history of or current substance use.   Protective factors:  strong bond to family/friends and responsibilities to others (spouse, pets, children, etc.).  Patient cites her boyfriend, kids and her dog.  History and Prior Methods of Self-injury: Denies  History of Suicide Attempts: Denies    ESS-6  1.a. Over the past 2 weeks, have you had thoughts of killing yourself? Yes   1.b. Have you ever attempted to kill yourself and, if yes, when did this last happen? No  2. Recent or current suicide plan? Yes  3. Recent or current intent to act on ideation? Yes  4. Lifetime psychiatric hospitalization? No  5. Pattern of excessive substance use? Yes  6. Current irritability, agitation, or aggression? No  ESS-6 Score: 3/6      Other Risk Areas  Aggressive/assumptive/homicidal risk factors: No   Sexually inappropriate behavior? No      Vulnerability to sexual exploitation? No     Clinical Presentation and Current Symptoms   Patient is calm and cooperative.  Laying in bed.    Attention, Hyperactivity, and Impulsivity: No   Anxiety:Yes: Generalized Symptoms: Cognitive " anxiety - feelings of doom, racing thoughts, difficulty concentrating  and Excessive worry    Behavioral Difficulties: Yes: Withdrawal/Isolation   Mood Symptoms: Yes: Appetite change/weight change , Feelings of helplessness , Feelings of hopelessness , Feelings of worthlessness , Impaired concentration, Impaired decision making , Isolative , Loss of interest / Anhedonia , Low self esteem , Sad, depressed mood  and Thoughts of suicide/death    Appetite: Yes: Loss of Appetite   Feeding and Eating: No  Interpersonal Functioning: Yes: Impaired Impulse Control  Learning Disabilities/Cognitive/Developmental Disorders: Yes: Other: ADHD by history   General Cognitive Impairments: Yes: Decision-Making and Judgment/Insight  Sleep: No   Psychosis: No    Trauma: No       Mental Status Exam:  Affect: Blunted  Appearance: Appropriate   Attention Span/Concentration: inattentive    Eye Contact: Variable  Fund of Knowledge: Appropriate   Language /Speech Content: Fluent  Language /Speech Volume: Soft   Language /Speech Rate/Productions: Normal   Recent Memory: Intact  Remote Memory: Intact  Mood: Depressed   Orientation:   Person: Yes   Place: Yes  Time of Day: Yes   Date: Yes   Situation (Do they understand why they are here?): Yes   Psychomotor Behavior: Underactive   Thought Content: Suicidal  Thought Form: Intact    Current Providers and Contact Information   Patient is her own legal guardian.     Primary Care Provider: No  Psychiatrist: No  Therapist: No  : No  CTSS or ARMHS: No  ACT Team: No  Other: Yes, Ritika, [facility]     Has an JOHANNY been signed? No     Clinical Summary and Recommendations    Clinical summary of assessment (include strengths, protective factors, community resources, and assessment of vulnerability/risk):   Suicidal ideation with plan and intent.  Substance abuse. Patient is currently doing a telehealth CD treatment program at [facility].  She has no other outpatient  providers and is not on any psychiatric medications.  Plan for inpatient hospitalization for further evaluation and stabilization.      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 Patient agrees with recommended level of care.      Details of final disposition include: Inpatient mental health . Awaiting mental health placement.     If Inpatient, is patient admitted voluntary? Yes   Patient aware of potential for transfer if there is not appropriate placement? No  Patient is willing to travel outside of the [facility] for placement? NA   Central Intake Notified? Yes  If Discharging, what are follow up needs? n/a      Duration of assessment time: .75 hrs    CPT code(s) utilized: 32295, up to 74 minutes      Chloé West

## 2021-11-23 NOTE — TELEPHONE ENCOUNTER
S:  AVIS Luevano called @ 11:31am with     B:Pt presents with SI and plan to overdose.   Pt has no prior Attempts.   Pt states her children are what keep her from carrying out SI plans.   Pt has a hx of Bipolar DO, Anxiety and ADHD.  Pt is active with [facility] for treatment from Meth.  Pt did relapse on meth last Friday after 2 weeks of sobriety but again, sober now5 days again.   Pt states she has no out pt providers, but did see a counselor once with [facility] - but no one consistently.  Pt reports not feeling safe and scared she will carry out her plan.   Pt does have a new cough that started today -  COVID test pending.    A:  Vol      R:  Patient cleared and ready for behavioral bed placement: No   COVID test Negative  Pass off to . @ 3pm

## 2021-11-23 NOTE — ED PROVIDER NOTES
"ED Provider Note  [facility]      History     Chief Complaint   Patient presents with     Suicidal     HPI  Evita Cornelius is a 31 year old female who presents to the ED for suicidal ideation. The patient has a history of depression for which she is not currently taking any medications, hypothyroidism and she ran out of levothyroxine, substance abuse - methamphetamine (patient currently attends treatment/counseling via zoom M,T,Th), bipolar disorder, and GERD. The patient reports increased stressors at home for about one month associated with her worsening depression including upcoming court dates and thinking about her children and how her addiction has effected them. She reports that today she just felt \"hopeless\" and \"like I couldn't do it anymore\". She attests to having a plan to take a bunch of pills to end her life. She called the suicide hotline who noted that there is a bed opening at an inpatient treatment center at [facility] but recommended she come to the ED to be evaluated prior to placement at [facility]. The patient last used intravenous methamphetamine one day ago, but prior to that had been sober for a few weeks. Other than the depression and suicidal ideation she notes that her left arm where she shot up feels like her muscles are tight, but denies redness or swelling or other symptoms such as fever, chills, abdominal pain, or difficulty breathing. The patient reports she just wants to be seen so she can get into the open bed at the facility and came here because she wanted to feel safe. She also notes that she would be open to establishing with primary for her depression and to get back on her levothyroxine. The patient denies any other complaints or concerns at this time.    Past Medical History  Past Medical History:   Diagnosis Date     Ingrowing nail     resection x2 left great toe 2005     Moderate major depression (H) 7/2/2009     Pain  "     Pain      Papanicolaou smear of cervix with low grade squamous intraepithelial lesion (LGSIL) 12/22/2010     Thyroid disease      From [facility]:  PAST MEDICAL HISTORY   I have reviewed the past medical history. No additional pertinent history except as listed below.   Patient Active Problem List   Diagnosis Date Noted     Hypothyroidism 04/05/2012   Priority: High     Bipolar disorder, current episode mixed, moderate (HC) 03/04/2017     Depression 10/13/2013     Substance abuse (HC) 10/13/2013     GERD (gastroesophageal reflux disease) 05/18/2011       Past Surgical History:   Procedure Laterality Date     C FOOT/TOES SURGERY PROC UNLISTED       No Known Allergies  Family History  Family History   Problem Relation Age of Onset     Depression Maternal Grandmother      Diabetes Maternal Grandmother      Anxiety Disorder Maternal Grandmother      Hypertension Mother      Diabetes Mother      Depression Mother      Anxiety Disorder Mother      Depression Father      Substance Abuse Father      Heart Disease Daughter         heart murmer     Social History   Social History     Tobacco Use     Smoking status: Current Every Day Smoker     Packs/day: 0.50     Years: 15.00     Pack years: 7.50     Types: Cigarettes     Smokeless tobacco: Never Used     Tobacco comment: 1/2 PPD   Substance Use Topics     Alcohol use: No     Drug use: Yes     Types: Methamphetamines     Comment: smoking meth, last use 3/16/20      Past medical history, past surgical history, medications, allergies, family history, and social history were reviewed with the patient. No additional pertinent items.       Review of Systems   Constitutional: Negative for fatigue and fever.   Respiratory: Negative for cough and shortness of breath.    Cardiovascular: Negative for chest pain.   Psychiatric/Behavioral: Positive for sleep disturbance and suicidal ideas. Negative for behavioral problems and self-injury.   All other systems reviewed and  "are negative.    A complete review of systems was performed with pertinent positives and negatives noted in the HPI, and all other systems negative.    Physical Exam   BP: 106/72  Pulse: 88  Temp: (!) 96.5  F (35.8  C)  Resp: 18  SpO2: 97 %  Physical Exam  Constitutional: Appears tired. Sleeping in bed.  HEENT: Normocephalic, atraumatic. Neck- Supple, gross ROM intact.   Respiratory: No respiratory distress, speaks full sentences easily.  Cardiovascular: Normal heart rate.  Musculoskeletal: Moving all 4 extremities intentionally and without pain. No obvious deformity.  Skin: Warm, dry. Point bruising on left antecubital fossa with some tightness on palpation, no swelling or erythema.   Neurologic: Alert & oriented x 3, speech clear, moving all extremities spontaneously   Psychiatric: Appears depressed, cooperative      ED Course      Procedures       The medical record was reviewed and interpreted.  Current labs reviewed and interpreted.  Previous labs reviewed and interpreted.       No results found for any visits on 11/23/21.  Medications - No data to display     Assessments & Plan (with Medical Decision Making)   Evita is a 31 year old female with a past medical history notable for depression, substance abuse, bipolar disorder, hypothyroidism - not currently taking any medications who presents to the emergency department for suicidal ideation with a plan to \"take a bunch of pills\". On evaluation the patient reports that she called the suicide hotline who said there was a bed available for her at the inpatient treatment facility [facility]\" but they said she should be evaluated here first. In the ED she was evaluated by behavioral health who states the patient does not appear stable for discharge to a facility at this time and I am in agreement with this assessment. Patient will be admitted to the hospital for mood stabilization and observation given significant safety concerns.     I have reviewed the " nursing notes. I have reviewed the findings, diagnosis, plan and need for follow up with the patient. She agreed with the plan and expressed understanding.      --    ED Attending Physician Attestation    I Ankita Deleon MD, cared for this patient with the Medical Student. I performed, or re-performed, the physical exam and medical decision-making. I have verified the accuracy of all the medical student findings and documentation above, and have edited as necessary.    Summary of HPI, PE, ED Course   Patient is a 31 year old female evaluated in the emergency department for worsening thoughts of depression and suicidal ideation.  Patient says that she now any psych meds but does have a history of ADHD bipolar anxiety and depression.  Says that she is been going outpatient treatment 3 times a week but feels that is not helping enough.  Lately she is been feeling hopeless and helpless.  She is having trouble sleeping.  Patient has ongoing issues with custody and with medical legal issues and court.  Patient denied any shortness of breath or chest pain.. Exam notable for and is sleeping comfortably.  No shortness of breath.  Patient currently feeling safe.  No active hallucinations or delusions.  Breathing well on room air.. ED course notable for the behavioral health  note for full details.  Behavioral health evaluated the patient and recommends admission to inpatient psych for further care.  Patient agrees this plan of care.  Patient does not need an ongoing one-to-one at this time.. After the completion of care in the emergency department, the patient was admitted to inpatient.    Critical Care & Procedures      Medical Decision Making  The medical record was reviewed and interpreted.  Previous labs reviewed and interpreted.    Per DEC evaluation:  Used IV meth since Friday.  Has been to CD treatment few times in the past.  Is in a day treatment program 3 times/week  Not on any psych meds.  Feels she  has ADHD, bipolar, anxiety, and depression.   No hallucinations or paranoia.   No interest, feeling hopeless, hates waking up.   DEC recommends admission.     Signed:  Ankita Deleon MD  November 23, 2021 at 2:43 PM    Signed:  Nurys Cardozo  November 23, 2021 at 11:09 AM      New Prescriptions    No medications on file       Final diagnoses:   Depression, unspecified depression type   Suicidal ideation   Polysubstance abuse (H)     Nurys Cardozo MS4 on 11/23/2021 at 8:51 AM   --    [facility] EMERGENCY DEPARTMENT  11/23/2021     Ankita Deleon MD  11/23/21 [id number]

## 2021-11-23 NOTE — ED TRIAGE NOTES
Having increased depression worse over the past 1 month, now feeling suicidal, denies any self harm prior to coming into the ed

## 2021-11-23 NOTE — PHARMACY-ADMISSION MEDICATION HISTORY
Admission Medication History Completed by Pharmacy    See [facility] Admission Navigator for allergy information, preferred outpatient pharmacy, prior to admission medications and immunization status.     Medication history sources:  patient via iPad interview, [employer]s    Pertinent changes made to PTA medication list:  Added: N/A  Deleted: N/A  Changed:   - levothyroxine 100-->200 mcg daily    Additional medication history information:   - Patient denies taking any additional Rx/OTC medications other than the ones listed below.    Prior to Admission medications    Medication Sig Last Dose Taking? Auth Provider   levothyroxine (SYNTHROID/LEVOTHROID) 200 MCG tablet Take 200 mcg by mouth every morning (before breakfast) More than a month Yes Unknown, Entered By History     Date completed: 11/23/21    Medication history completed by:   Isidoro Hanna, PharmD, BCPS  General acute hospital  Emergency Department: Ascom *[id number]

## 2021-11-24 VITALS
DIASTOLIC BLOOD PRESSURE: 69 MMHG | RESPIRATION RATE: 16 BRPM | TEMPERATURE: 97.9 F | WEIGHT: 195 LBS | BODY MASS INDEX: 33.47 KG/M2 | OXYGEN SATURATION: 99 % | HEART RATE: 66 BPM | SYSTOLIC BLOOD PRESSURE: 105 MMHG

## 2021-11-24 LAB
ALBUMIN UR-MCNC: NEGATIVE MG/DL
APPEARANCE UR: ABNORMAL
BACTERIA #/AREA URNS HPF: ABNORMAL /HPF
BILIRUB UR QL STRIP: NEGATIVE
CAOX CRY #/AREA URNS HPF: ABNORMAL /HPF
CHOLEST SERPL-MCNC: 143 MG/DL
COLOR UR AUTO: YELLOW
GLUCOSE UR STRIP-MCNC: NEGATIVE MG/DL
HDLC SERPL-MCNC: 37 MG/DL
HGB UR QL STRIP: NEGATIVE
HOLD SPECIMEN: NORMAL
KETONES UR STRIP-MCNC: NEGATIVE MG/DL
LDLC SERPL CALC-MCNC: 87 MG/DL
LEUKOCYTE ESTERASE UR QL STRIP: NEGATIVE
MUCOUS THREADS #/AREA URNS LPF: PRESENT /LPF
NITRATE UR QL: NEGATIVE
NONHDLC SERPL-MCNC: 106 MG/DL
PH UR STRIP: 5.5 [PH] (ref 5–7)
RBC URINE: 0 /HPF
SP GR UR STRIP: 1.03 (ref 1–1.03)
SQUAMOUS EPITHELIAL: 4 /HPF
TRANSITIONAL EPI: <1 /HPF
TRIGL SERPL-MCNC: 93 MG/DL
URATE CRY #/AREA URNS HPF: ABNORMAL /HPF
UROBILINOGEN UR STRIP-MCNC: NORMAL MG/DL
WBC URINE: 3 /HPF

## 2021-11-24 PROCEDURE — 80061 LIPID PANEL: CPT | Performed by: PSYCHIATRY & NEUROLOGY

## 2021-11-24 PROCEDURE — 36415 COLL VENOUS BLD VENIPUNCTURE: CPT | Performed by: PSYCHIATRY & NEUROLOGY

## 2021-11-24 PROCEDURE — 99235 HOSP IP/OBS SAME DATE MOD 70: CPT | Performed by: NURSE PRACTITIONER

## 2021-11-24 PROCEDURE — 99232 SBSQ HOSP IP/OBS MODERATE 35: CPT | Performed by: PHYSICIAN ASSISTANT

## 2021-11-24 PROCEDURE — 250N000013 HC RX MED GY IP 250 OP 250 PS 637: Performed by: PSYCHIATRY & NEUROLOGY

## 2021-11-24 PROCEDURE — 99207 PR CONSULT E&M CHANGED TO SUBSEQUENT LEVEL: CPT | Performed by: PHYSICIAN ASSISTANT

## 2021-11-24 RX ORDER — LEVOTHYROXINE SODIUM 200 UG/1
200 TABLET ORAL
Qty: 30 TABLET | Refills: 0 | Status: SHIPPED | OUTPATIENT
Start: 2021-11-25 | End: 2024-02-21

## 2021-11-24 RX ADMIN — LEVOTHYROXINE SODIUM 200 MCG: 100 TABLET ORAL at 07:52

## 2021-11-24 ASSESSMENT — ACTIVITIES OF DAILY LIVING (ADL)
HYGIENE/GROOMING: INDEPENDENT
ORAL_HYGIENE: INDEPENDENT
DRESS: INDEPENDENT
LAUNDRY: WITH SUPERVISION

## 2021-11-24 NOTE — PLAN OF CARE
Problem: Depression  Goal: Improved Mood  Outcome: Declining    Evita Cornelius is a 31 year old  female that was admitted to [facility]. She is a voluntary admit and has signed in. She currently is refusing to participate in the admission process. Was cooperative with Admission Search. She told staff in the ER that she has SI with plans to Overdose. She has a history of Bipolar DO, Anxiety and ADHD. She had two weeks of sobriety from meth and relapsed last Friday. Using Meth IV. Has been at [facility] for treatment.      Medical  Hypothyroidism,  TSH of 98.48 today, has not taken her Synthroid for over a month.  Covid neg today,    has a new cough that stated today.    Plan: Provide for Safety, Monitor and Assess Mood and Behavior, Encourage Medication Compliance, Develop Trust.

## 2021-11-24 NOTE — PROGRESS NOTES
Pt appears to be sleeping all shift.  Pt snoring loudly when approached by staff for assessment.  Pt had signed voluntary admission form yesterday.  UDS was positive for amphetamines.  Pt had a bladder infection in May 2021 but no UA was ordered upon admission so on-call provider was contacted for a UA with reflex Add-On for UDS specimen.   Remains sleeping at this time.

## 2021-11-24 NOTE — DISCHARGE INSTRUCTIONS
Behavioral Discharge Planning and Instructions    Summary: You were admitted on 11/23/2021 due to Depression, Suicidal Ideations and Chemical Use Issues. You were treated by DEBBIE Ley CNP and discharged **AGAINST MEDICAL ADVICE** on 11/24/21 from [facility] to Home.    Main Diagnosis:   Depression, unspecified depression type  Suicidal ideation  Polysubstance abuse (H)    Health Care Follow-up:     If no appointments scheduled, explain: You signed a 12 hour intent to leave form and are being discharged AMA before team was able to schedule outpatient appointments.    Attend all scheduled appointments with your outpatient providers. Call at least 24 hours in advance if you need to reschedule an appointment to ensure continued access to your outpatient providers.     Major Treatments, Procedures and Findings:  You were provided with: a psychiatric assessment, assessed for medical stability and medication evaluation and/or management    Symptoms to Report: Feeling more aggressive, increased confusion, losing more sleep, mood getting worse, or thoughts of suicide.    Early warning signs can include: Increased depression or anxiety sleep disturbances increased thoughts or behaviors of suicide or self-harm  increased unusual thinking, such as paranoia or hearing voices.    Safety and Wellness: Take all medicines as directed. Make no changes unless your doctor suggests them. Follow treatment recommendations. Refrain from alcohol and non-prescribed drugs. Ask your support system to help you reduce your access to items that could harm yourself or others. If there is a concern for safety, call 656.    Resources:   Mental Health Resources:   -National Wheeler on Mental Illness (JOANN) [place]: Connect for help, to navigate the mental health system, and for support and for resources. Call: 4-624-KQKN-Helps / 2-759-597-6952  -Crisis Text Line: The 24/7 emergency service is available if you or someone you know is  "experiencing a psychiatric or mental health crisis. Text: \"[place]\" to 143319  -[place] Warmline: Are you an adult needing support? Talk to a specialist who has firsthand experience living with a mental health condition. Call: 351.834.4109  Text: \"Support\" to 21689  -National Suicide Prevention Lifeline: The 24/7 lifeline provides support when in distress, has prevention and crisis resources for you or your loved ones, and resources for professionals.  Call 7-629-930-TALK (4612)  -Peer Support Connection Warmlines: Ioba-lp-dibw telephone support that s safe and supportive. Open 5 p.m. to 9 a.m. Call or text: 1-629.711.4177   -COVID Cares Stress Phone Support Service. Any [place] experiencing stress can call 474-JEWN9[place] (387-280-6678) for free telephone support from 9am to 9pm every day. The service is a collaboration with volunteers from the [place] Psychiatric Society, the [place] Psychological Association, the [facility], and [facility]. The free service is also accessible at [employer] First where searchers can also find psychiatric and mental health services availability and real-time Substance Use Disorder Treatment program   openings.  -[facility] Crisis (COPE) Response - Adult 526 837-1969    Outpatient Therapy Resources: (many of these clinics/organizations also offer outpatient psychiatry and medication management services - call to inquire)  -[facility] -  [phone]  https://www.Pogoapp.EVERYWARE/care/specialty/mental-behavioral-health/  https://www.Pogoapp.EVERYWARE/care/find/doctors/psychologists/  -[facility] - https://www.[place]alth[place].org/treatments/Counseling-Adult  -[place] [facility] - https://[facility].EVERYWARE/  -[facility] - (318.772.4780)  https://[facility]-[place].EVERYWARE/  -[facility] - (1-866.738.6801) " https://www.[employer]/  -[facility] - https://www.[facility].com/  -[facility] - https://[facility].[employer]/  -[facility] - https://walkin.org/    General Medication Instructions:     See your medication sheet(s) for instructions.     Take all medicines as directed.  Make no changes unless your doctor suggests them.     Go to all your doctor visits.    Be sure to have all your required lab tests. This way, your medicines can be refilled on time.    Do not use any drugs not prescribed by your doctor.    Avoid alcohol.    Advance Directives:   Scanned document on file with [employer]? No scanned doc  Is document scanned? No. Copy Requested.  Honoring Choices Your Rights Handout: Informed and given  Was more information offered? Pt declined    The Treatment team has appreciated the opportunity to work with you. If you have any questions or concerns about your recent admission, you can contact the unit which can receive your call 24 hours a day, 7 days a week. They will be able to get in touch with a Provider if needed. The unit number is [phone].

## 2021-11-24 NOTE — H&P
"[facility], [place]   Psychiatric History and Physical  Admission date: 11/23/2021        Chief Complaint:   I had a breakdown yesterday, I have a lot going on that I cannot handle \"        HPI:     Pt is a 30 y/o female  with a history of substance abuse, bipolar, depression, anxiety, and ADHD. Admitted  to station [address]  [facility] for symptoms of depression, suicidal ideation and substance use.    Met with pt in her room today, she was very dismissive, upset and rude.  Appears disheveled,  perseverates on wanting to go home.  Patient states I brought myself here voluntarily and have to live voluntarily.  Didn't respond to most of the questions, states I just want to go. She admits to having suicidal ideation with plans to OD on some medications but never follow through with the plans. She states her stressors include financial stressors but didn't elaborate more. Reports symptoms of worthlessness, fear of impending doom, excessive worry, fatigue, lack of appetite, poor sleep for the past month. Patient says she has been having suicidal thoughts for over a month now and she has been off her medication (levothyroxine) for a while. Reports she hasn't been on any  psychiatric medications and does not want to be started on any medication.\"  I do not want any medications, I am not trying to be rude or crabby to you, it  is not your fault, I just want to go home \". Reports very high anxiety c/o wanting to go home and see my animals. Discussed options of inpatient treatment with patient but she declined stating that she is currently doing online treatment Monday Tuesdays and Thursdays through [facility].  Patient continues to ask if I am going to keep her or discharge her now. Didn't want to continue with the interview and walked out on me. She is currently in her room sleeping and has not verbalized her intention to leave further. UDS is positive for amphetamines. I intend to start patient " "on Lexapro 10 mg daily  if she changes her mind and accepts to start treatment.      Per chart review, patient was brought to the ER for suicidal ideation with plans to take a bunch of pills which she refers to as \"M boxes\" or \"fetty percs.\"  This is her first psychiatric hospitalization.  She is currently on probation for aiding an offender and other drug related offenses.  She has other legal issues and court dates in January '22 where if convicted she could go to custodial for a long time.  Patient states a  wants $40,000 and she is unemployed.  She reports continuing to struggle with her addiction.    Patient reports hx of methamphetamine use.  She was a couple weeks sober and relapsed on Friday.  Uses  IV.  Last use  two days ago. She denies other substance abuse.  Record indicates polysubstance abuse. she has a  hx of treatment at [facility], and [facility].  Reports she graduated from [facility] and did not complete the other programs.  She identifies having been court ordered to treatment previously.       information provided here were obtained from chart review, my personal observation  and report from patient.             Past Psychiatric History:     Psychiatrist: denies    Therapist: denies    Case Management: denies    Hospitalizations: denies    History of Commitment: patient states yes but did not elaborate    Past  Psychiatric Medications: none    ECT:  denies    Suicide Attempts/Gun Access: denies          Substance Use and History:     Alcohol: refused to respond    Cannabis:  refused to respond    Nicotine:  refused to respond    Cocaine:  refused to respond    Methamphetamine: yes. Per chart review    Opiates/Heroin:  refused to respond    Benzodiazepines:  refused to respond    Hallucinogens:  refused to respond    Inhalants:  refused to respond        Past Medical History:   PAST MEDICAL HISTORY:   Past Medical History:   Diagnosis Date     Ingrowing nail     resection x2 left great toe " 2005     Moderate major depression (H) 7/2/2009     Pain      Pain      Papanicolaou smear of cervix with low grade squamous intraepithelial lesion (LGSIL) 12/22/2010     Thyroid disease      PAST SURGICAL HISTORY:   Past Surgical History:   Procedure Laterality Date     C FOOT/TOES SURGERY PROC UNLISTED           Family History:     Psychiatric: mother and maternal grandmother have a hx of depression and anxiety.  Father's side is unknown.    Chemical: unknown, pt non compliant    Suicide: unknown  FAMILY HISTORY:   Family History   Problem Relation Age of Onset     Depression Maternal Grandmother      Diabetes Maternal Grandmother      Anxiety Disorder Maternal Grandmother      Hypertension Mother      Diabetes Mother      Depression Mother      Anxiety Disorder Mother      Depression Father      Substance Abuse Father      Heart Disease Daughter         heart murmer           Social History:      Patient was born and raised in [place].  She reports her father has never been in her life.  She lives with her boyfriend.  Never .  She has two children, ages 10 and 8.  They are with other family members.  One is with grandparents and the other is with the child's father.  Patient is unemployed.      Legal:  yes. probation for aiding an offender and other drug related offenses.  She has other legal issues and court dates in January '22 where if convicted she could go to care home for a long time    SOCIAL HISTORY:   Social History     Tobacco Use     Smoking status: Current Every Day Smoker     Packs/day: 0.50     Years: 15.00     Pack years: 7.50     Types: Cigarettes     Smokeless tobacco: Never Used     Tobacco comment: 1/2 PPD   Substance Use Topics     Alcohol use: No            Physical ROS:   The patient endorsed depression and suicidal ideation. The remainder of 10-point review of systems was negative except as noted in HPI.         PTA Medications:       Medications Prior to Admission   Medication Sig Dispense  Refill Last Dose     levothyroxine (SYNTHROID/LEVOTHROID) 200 MCG tablet Take 200 mcg by mouth every morning (before breakfast)   More than a month          Allergies:     No Known Allergies       Labs:       Recent Results (from the past 48 hour(s))   Asymptomatic COVID-19 Virus (Coronavirus) by PCR Oropharynx    Collection Time: 11/23/21 12:09 PM    Specimen: Oropharynx; Swab   Result Value Ref Range    SARS CoV2 PCR Negative Negative, Testing sent to reference lab. Results will be returned via unsolicited result   HCG qualitative urine    Collection Time: 11/23/21  5:27 PM   Result Value Ref Range    hCG Urine Qualitative Negative Negative   Drug abuse screen 1 urine (ED)    Collection Time: 11/23/21  5:27 PM   Result Value Ref Range    Amphetamines Urine Screen Positive (A) Screen Negative    Barbiturates Urine Screen Negative Screen Negative    Benzodiazepines Urine Screen Negative Screen Negative    Cannabinoids Urine Screen Negative Screen Negative    Cocaine Urine Screen Negative Screen Negative    Opiates Urine Screen Negative Screen Negative   UA reflex to Microscopic and Culture    Collection Time: 11/23/21  5:27 PM    Specimen: Urine, NOS   Result Value Ref Range    Color Urine Yellow Colorless, Straw, Light Yellow, Yellow    Appearance Urine Slightly Cloudy (A) Clear    Glucose Urine Negative Negative mg/dL    Bilirubin Urine Negative Negative    Ketones Urine Negative Negative mg/dL    Specific Gravity Urine 1.032 1.003 - 1.035    Blood Urine Negative Negative    pH Urine 5.5 5.0 - 7.0    Protein Albumin Urine Negative Negative mg/dL    Urobilinogen Urine Normal Normal, 2.0 mg/dL    Nitrite Urine Negative Negative    Leukocyte Esterase Urine Negative Negative    Bacteria Urine Few (A) None Seen /HPF    Mucus Urine Present (A) None Seen /LPF    Calcium Oxalate Crystals Urine Moderate (A) None Seen /HPF    Uric Acid Crystals Urine Few (A) None Seen /HPF    RBC Urine 0 <=2 /HPF    WBC Urine 3 <=5 /HPF     Squamous Epithelials Urine 4 (H) <=1 /HPF    Transitional Epithelials Urine <1 <=1 /HPF   Comprehensive metabolic panel    Collection Time: 11/23/21  5:28 PM   Result Value Ref Range    Sodium 138 133 - 144 mmol/L    Potassium 3.9 3.4 - 5.3 mmol/L    Chloride 109 94 - 109 mmol/L    Carbon Dioxide (CO2) 24 20 - 32 mmol/L    Anion Gap 5 3 - 14 mmol/L    Urea Nitrogen 11 7 - 30 mg/dL    Creatinine 0.79 0.52 - 1.04 mg/dL    Calcium 8.4 (L) 8.5 - 10.1 mg/dL    Glucose 95 70 - 99 mg/dL    Alkaline Phosphatase 70 40 - 150 U/L    AST 13 0 - 45 U/L    ALT 17 0 - 50 U/L    Protein Total 7.2 6.8 - 8.8 g/dL    Albumin 3.2 (L) 3.4 - 5.0 g/dL    Bilirubin Total 0.4 0.2 - 1.3 mg/dL    GFR Estimate >90 >60 mL/min/1.73m2   TSH    Collection Time: 11/23/21  5:28 PM   Result Value Ref Range    TSH 98.48 (H) 0.40 - 4.00 mU/L   CBC with platelets and differential    Collection Time: 11/23/21  5:28 PM   Result Value Ref Range    WBC Count 6.8 4.0 - 11.0 10e3/uL    RBC Count 4.46 3.80 - 5.20 10e6/uL    Hemoglobin 13.4 11.7 - 15.7 g/dL    Hematocrit 41.8 35.0 - 47.0 %    MCV 94 78 - 100 fL    MCH 30.0 26.5 - 33.0 pg    MCHC 32.1 31.5 - 36.5 g/dL    RDW 13.3 10.0 - 15.0 %    Platelet Count 242 150 - 450 10e3/uL    % Neutrophils 60 %    % Lymphocytes 27 %    % Monocytes 7 %    % Eosinophils 5 %    % Basophils 1 %    % Immature Granulocytes 0 %    NRBCs per 100 WBC 0 <1 /100    Absolute Neutrophils 4.1 1.6 - 8.3 10e3/uL    Absolute Lymphocytes 1.8 0.8 - 5.3 10e3/uL    Absolute Monocytes 0.5 0.0 - 1.3 10e3/uL    Absolute Eosinophils 0.4 0.0 - 0.7 10e3/uL    Absolute Basophils 0.0 0.0 - 0.2 10e3/uL    Absolute Immature Granulocytes 0.0 <=0.0 10e3/uL    Absolute NRBCs 0.0 10e3/uL          Physical and Psychiatric Examination:       /76   Pulse 94   Temp 98.4  F (36.9  C) (Oral)   Resp 18   Wt 88.5 kg (195 lb)   LMP 11/16/2021   SpO2 97%   BMI 33.47 kg/m    to get vitals in here: (.vs and enter)  Weight is 195 lbs 0 oz  Body mass  index is 33.47 kg/m .    Physical Exam:  I have reviewed the physical exam as documented by the medical team and agree with findings and assessment and have no additional findings to add at this time.    Mental Status Exam:  Appearance: awake, alert, dressed in hospital scrubs and disheveled   Attitude:  guarded and less cooperative  Eye Contact:  poor   Mood:  angry, anxious, sad  and depressed  Affect:  mood congruent  Speech:  clear, coherent  Language: fluent and intact in English  Psychomotor, Gait, Musculoskeletal:  no evidence of tardive dyskinesia, dystonia, or tics  Thought Process:  linear  Associations:  no loose associations  Thought Content:  passive suicidal ideation present  Insight:  limited  Judgement:  limited  Oriented to:  time, person, and place  Attention Span and Concentration:  limited  Recent and Remote Memory:  limited  Fund of Knowledge:  appropriate         Admission Diagnoses:     Depression, unspecified depression type  Suicidal ideation  Polysubstance abuse (H)        Plan:     Plan  1. Education given regarding diagnostic and treatment options with risks, benefits and alternatives and adequate verbalization of understanding.  2. Admitted to station [address] due to  depression, suicidal ideation and substance use  3. Medications: date : PTA medications reviewed.    Levothyroxine 200 mcg in the AM  4. Medications:  Hospital  Levothyroxine 200 mcg in the AM                       5. Consultations: Hospitalist to follow as needed.  6.   is following in regards to collecting and reviewing collateral information, referrals and disposition planning.  Legal: volunatry  Anticipated Discharge:  TBD  Further treatment programming to be determined throughout the hospital course.          This note was created with help of Dragon dictation system. Grammatical / typing errors are not intentional.     DEBBIE Ley, CNP date: 11/24/2021  Time: 12:12 PM        CERTIFICATION      Initial  Certification  I certify that the inpatient psychiatric facility admission was medically necessary for treatment which could reasonably be expected to improve the patient s condition.                                                                         I estimate 3-5 days of hospitalization is necessary for proper treatment of the patient. My plans for post-hospital care this patient are DEBBIE Kebede, GITA     -     date: 11/24/2021   -   Time: 12:12pm.

## 2021-11-24 NOTE — PLAN OF CARE
Patient focused on being discharged today. Patient withdrawn and isolative to her room. Discussed her stay with provider Feng Castano. Denies suicidal ideation.

## 2021-11-24 NOTE — CONSULTS
[facility]    Internal Medicine Initial Consult       Date of Admission: 11/23/2021  Consult Requested by: Slim Portillo MD  Reason for Consult: Hypothyroidism     Assessment & Recommendations  Evita Cornelius is a 31 year old woman with a past medical history of depression, bipolar disorder, substance abuse (methamphetamines) hypothyroidism and GERD who is admitted to [facility] with suicidal ideations         Suicidal Ideations   Bipolar Disorder - Patient presents with suicidal ideations and a plan in place to swallow pills.    -Management per psychiatry team.     Hypothyroidism - TSH 98.48.  Patient notes she has not taken her levothyroxine in one month.   -Agree with resuming levothyroxine 200mcg daily  -Repeat thyroid studies in 6 weeks with PCP     Methamphetamine Abuse - Patient uses IV methamphetamine, last use 11/22.  Prior to that had been sober for several weeks.  She currently attends virtual treatment and counseling multiple days a week.  -[facility]     Medicine will sign off, please page with any additional concerns.     Lindsey Pittman PA-C  Hospitalist Service  Contact information available via [facility] Paging/Directory  ______________________________________________________________________    Reason for Admission  Suicidal ideations    Chief Complaint   Suicidal     History of Present Illness   History is obtained from the patient and medical record.     Evita Cornelius is a 31 year old year old woman with a PMH as listed above who is admitted to behavioral health for suicidal ideations. Patient endorses suicidal ideations with a plan to ingest a bunch of pills in order to end her life.  She notes for the past month she has not been taking her levothyroxine.  She states prior to that she had been very compliant.     She denies fever, chills, chest pain, palpitations, SOB, cough, nausea, vomiting, abdominal pain, change in bowel or urinary habits.        Review of Systems   10 point ROS performed and negative unless otherwise noted in HPI     Past Medical History    I have reviewed this patient's medical history and updated it with pertinent information if needed.   Past Medical History:   Diagnosis Date     Ingrowing nail     resection x2 left great toe 2005     Moderate major depression (H) 7/2/2009     Pain      Pain      Papanicolaou smear of cervix with low grade squamous intraepithelial lesion (LGSIL) 12/22/2010     Thyroid disease         Past Surgical History   I have reviewed this patient's surgical history and updated it with pertinent information if needed.  Past Surgical History:   Procedure Laterality Date     C FOOT/TOES SURGERY PROC UNLISTED          Social History   Social History     Tobacco Use     Smoking status: Current Every Day Smoker     Packs/day: 0.50     Years: 15.00     Pack years: 7.50     Types: Cigarettes     Smokeless tobacco: Never Used     Tobacco comment: 1/2 PPD   Substance Use Topics     Alcohol use: No     Drug use: Yes     Types: Methamphetamines     Comment: smoking meth, last use 3/16/20       Family History   I have reviewed this patient's family history and updated it with pertinent information if needed.   Family History   Problem Relation Age of Onset     Depression Maternal Grandmother      Diabetes Maternal Grandmother      Anxiety Disorder Maternal Grandmother      Hypertension Mother      Diabetes Mother      Depression Mother      Anxiety Disorder Mother      Depression Father      Substance Abuse Father      Heart Disease Daughter         heart murmer       Medications   Medications Prior to Admission   Medication Sig Dispense Refill Last Dose     levothyroxine (SYNTHROID/LEVOTHROID) 200 MCG tablet Take 200 mcg by mouth every morning (before breakfast)   More than a month       Allergies    No Known Allergies    Physical Exam   /76   Pulse 94   Temp 98.4  F (36.9  C) (Oral)   Resp 18   Wt 88.5 kg (195  lb)   LMP 11/16/2021   SpO2 97%   BMI 33.47 kg/m     GENERAL: in NAD, cooperative  HEENT: Anicteric sclera. Mucous membranes moist.   CV: RRR. S1, S2. No murmurs appreciated.   RESPIRATORY: Effort normal on room air. Lungs CTAB with no wheezing, rales, rhonchi.   GI: Abdomen soft, non distended, non tender.   NEUROLOGICAL: No focal deficits. Moves all extremities.   EXTREMITIES: No peripheral edema. Warm and well perfused.   SKIN: No jaundice. No rashes.     Data   Data reviewed today: I reviewed all medications, new labs and imaging results over the last 24 hours.

## 2021-11-24 NOTE — PROGRESS NOTES
11/23/21 2023   Valuables   Patient Belongings locker   Patient Belongings Put in Hospital Secure Location (Security or Locker, etc.) body jewelry;clothing   Did you bring any home meds/supplements to the hospital?  No     In locker:  Leggings  Tank top x2  Sweatshirt  Bra  Socks  Mask  Black Shoes  Dog tag necklace  Damaged [place] license   Winter jacket    No phone, keys, or wallet brought in  Nothing sent to security     A               Admission:  I am responsible for any personal items that are not sent to the safe or pharmacy.  [facility] is not responsible for loss, theft or damage of any property in my possession.    Signature:  _________________________________ Date: _______  Time: _____                                              Staff Signature:  ____________________________ Date: ________  Time: _____      2nd Staff person, if patient is unable/unwilling to sign:    Signature: ________________________________ Date: ________  Time: _____     Discharge:  [facility] has returned all of my personal belongings:    Signature: _________________________________ Date: ________  Time: _____                                          Staff Signature:  ____________________________ Date: ________  Time: _____

## 2021-11-24 NOTE — PLAN OF CARE
Initial Psychosocial Assessment    I have reviewed the chart, met with the patient, and developed Care Plan.  Information for assessment was obtained from:     Chart review and patient interview    Presenting Problem:  Pt is a 31 year old female with a history of substance abuse, bipolar, depression, anxiety, and ADHD who presented to [facility] by self following concern for suicidal ideation and substance use. Pt endorsed plan to overdose on pills. Pt was admitted to [facility] voluntarily.   Recent stressors include: relapse on IV methamphetamine, upcoming court dates, unemployment    History of Mental Health and Chemical Dependency:  Mental Health History:  -Previous psychiatric hospitalizations: Pt denies    -Diagnoses: History of substance abuse, bipolar, depression, anxiety, and ADHD    -Medications: Pt denies psychiatric medications and reports non-compliance with thyroid medication for about a month.       Chemical Dependency History:  -Summary of use: Patient states she had been sober for a couple weeks and relapsed on IV on Friday.  She reports using daily, last use was yesterday.  She reports continuing to struggle with her addiction.  Pt's drug of choice is methamphetamine.     -Chemical dependency treatment/detox: Patient reports being in CD treatment via telehealth M, T, TH through [facility]. Pt reportedly loves the program. She has a history of past treatment at [facility], [facility], and [facility]. Pt graduated from [facility] but did not complete the other programs. She has been court ordered to attend treatment in the past.     Family Description (Constellation, Family Psychiatric History):  Constellation: Patient was born and raised in [place].  She reports her father has never been in her life.  She lives with her boyfriend.  Never .  She has two children, ages 10 and 8.  They are with other family members.  One is with grandparents and the other is with the child's  father.    Family Psychiatric History: Pt's mother and maternal grandmother have a hx of depression and anxiety.  Father's side is unknown.  Chart shows history of depression and substance use in pt's dad.     Significant Life Events (Illness, Abuse, Trauma, Death):  Pt's legal issues are primary concern.   Pt's dad has not been involved in her life.     Living Situation:  Pt states she lives in [place] currently.    Educational Background:  Unable to assess    Occupational History:  Pt states she does have work history but did not go into further detail.     Financial Status:  Health insurance: [employer]  Employment status: Unemployed    Legal Issues:  Legal status during current admission: Voluntary  She is currently on probation for aiding an offender and other drug related offenses.  She has other legal issues and court dates in January '22 where if convicted she could go to prison for a long time.  Patient states a  wants $40,000 and she is unemployed.  Probation in [facility] [place].    Ethnic/Cultural Issues:  None    Spiritual Orientation:  Pt denies     Service History:  None    Social Functioning (organization, interests):  Pt was very dismissive during interview and states she wants to go home.     Current Treatment Providers are:  PCP: Peter Cardoso DO of [facility] (phone: [phone])    Social Service Assessment/Plan:  Patient will have psychiatric assessment and medication management by the psychiatrist. Medications will be reviewed and adjusted per /MD/APRN CNP as indicated. The treatment team will continue to assess and stabilize the patient's mental health symptoms with the use of medications and therapeutic programming. Hospital staff will provide a safe environment and a therapeutic milieu. Staff will continue to assess patient as needed. Patient will participate in unit groups and activities. Patient will receive individual and group  support on the unit.      CTC will do individual inpatient treatment planning and after care planning. CTC will discuss options for increasing community supports with the patient. CTC will coordinate with outpatient providers and will place referrals to ensure appropriate follow up care is in place.

## 2021-11-24 NOTE — PROGRESS NOTES
Discharge Note:     Patient discharged today Wednesday 11/24/2021 at 1823 accompanied by: Patient's sister and destination is home.     Discharge paperwork reviewed and medications reviewed with patient who verbalizes understanding.      Patient has verbalized understanding of discharge instructions and medication administration.      Patient states that he/she is ready for discharge. Affect is flat on approach. Mood is irritable yet calm. Denies suicidal ideation and self injurious thoughts. Denies homicidal ideation. Denies auditory and visual hallucinations. Denies anxiety and depression.      AVS reviewed, received and signed for: Yes     Medications from pharmacy received: Yes     Security item returned: N/A     Locker items received: Yes

## 2021-11-24 NOTE — PLAN OF CARE
Assessment/Intervention/Current Symtoms and Care Coordination:  -Refer to psychosocial completed on 11/24/21 for assessment/social functioning  -Chart review  -Team meeting - provider will discuss CD assessment order with pt as notes from ED have indicated pt is seeking treatment as [facility].   -Provider states pt would not cooperate with assessment and may potentially choose to sign 12 hour intent to leave. If pt does sign, provider states she plans to discharge pt AMA.   -Writer met with pt to introduce self and explain role. Pt was dismissive with interview for initial psychosocial assessment. Writer asked if she is interested in referral to [facility] and pt states she wants to go home. She asked if she was being discharged today and writer explained 12 hour intent to leave process and that discharge would be AMA. She stated she wants to complete the form and came to the desk to discuss with RN.   -RN reports pt signed 12 hour intent and provider was notified. AVS reflects AMA discharge and is complete - no appointments were made due to limited time.   Current Symptoms include the following: patient is irritable and anxious  Precautions: SI and Withdrawal    Discharge Plan or Goal:  Return home    Barriers to Discharge:  Patient signed 12 hour intent and is discharging today AMA    Referral Status:  No new referrals    Legal Status:  Patient is voluntary      Contacts:  JOHANNY not obtained

## 2021-11-24 NOTE — PLAN OF CARE
OT NONATTENDANCE NOTE  Problem: Additional Goals  Goal: BEH OT Goal 1  Description: BEH OT Goal 1    Pt has not attended scheduled occupational therapy sessions this date. She signed 12 hour intent to leave and is being discharged.

## 2021-11-24 NOTE — PLAN OF CARE
BEHAVIORAL TEAM DISCUSSION    Participants: DEBBIE Ley, CNP; Belen Desir RN; Karyn Jacobo, OTR/L; GWENDOLYN Pitts  Progress: Minimal  Anticipated length of stay: 3-5 days, pending stabilization and appropriate disposition plan.   Continued Stay Criteria/Rationale: Patient presented to ED following suicidal ideation following increased stressors and worsening depression. Patient reports feeling hopeless and has a plan to overdose on pills. Patient recently relapsed on methamphetamine. This patient requires symptom stabilization, medication management, and appropriate discharge plan.   Medical/Physical: Patient has history of thyroid disease.   Precautions:   Behavioral Orders   Procedures     Code 1 - Restrict to Unit     Discontinue 1:1 attendant for suicide risk     Order Specific Question:   I have performed an in person assessment of the patient     Answer:   Based on this assessment the patient no longer requires a one on one attendant at this point in time.     Routine Programming     As clinically indicated     Status 15     Every 15 minutes.     Suicide precautions     Patients on Suicide Precautions should have a Combination Diet ordered that includes a Diet selection(s) AND a Behavioral Tray selection for Safe Tray - with utensils, or Safe Tray - NO utensils       Withdrawal precautions     Plan: Assess patient for mental health and medication needs.  Return home with outpatient providers and Attend MICD treatment   Rationale for change in precautions or plan: Initial plan

## 2021-11-25 NOTE — DISCHARGE SUMMARY
"Psychiatric Discharge Summary    Evita Cornelius MRN# [id number]   Age: 31 year old YOB: 1990     Date of Admission:  11/23/2021  Date of Discharge:  11/23/2021  Admitting Physician:  Slim Portillo MD  Discharge Physician:  DEBBIE Nelson         Event Leading to Hospitalization:   Pt is a 32 y/o female  with a history of substance abuse, bipolar, depression, anxiety, and ADHD. Admitted  to [facility] for symptoms of depression, suicidal ideation and substance use.     Met with pt in her room today, she was very dismissive, upset and rude.  Appears disheveled,  perseverates on wanting to go home.  Patient states I brought myself here voluntarily and have to live voluntarily.  Didn't respond to most of the questions, states I just want to go. She admits to having suicidal ideation with plans to OD on some medications but never follow through with the plans. She states her stressors include financial stressors but didn't elaborate more. Reports symptoms of worthlessness, fear of impending doom, excessive worry, fatigue, lack of appetite, poor sleep for the past month. Patient says she has been having suicidal thoughts for over a month now and she has been off her medication (levothyroxine) for a while. Reports she hasn't been on any  psychiatric medications and does not want to be started on any medication.\"  I do not want any medications, I am not trying to be rude or crabby to you, it  is not your fault, I just want to go home \". Reports very high anxiety c/o wanting to go home and see my animals. Discussed options of inpatient treatment with patient but she declined stating that she is currently doing online treatment Monday Tuesdays and Thursdays through [facility].  Patient continues to ask if I am going to keep her or discharge her now. Didn't want to continue with the interview and walked out on me. She is currently in her room sleeping and has not verbalized her " "intention to leave further. UDS is positive for amphetamines. I intend to start patient on Lexapro 10 mg daily  if she changes her mind and accepts to start treatment.       Per chart review, patient was brought to the ER for suicidal ideation with plans to take a bunch of pills which she refers to as \"M boxes\" or \"fetty percs.\"  This is her first psychiatric hospitalization.  She is currently on probation for aiding an offender and other drug related offenses.  She has other legal issues and court dates in January '22 where if convicted she could go to alf for a long time.  Patient states a  wants $40,000 and she is unemployed.  She reports continuing to struggle with her addiction.  Patient reports hx of methamphetamine use.  She was a couple weeks sober and relapsed on Friday.  Uses  IV.  Last use  two days ago. She denies other substance abuse.  Record indicates polysubstance abuse. she has a  hx of treatment at [facility], and [facility].  Reports she graduated from [facility] and did not complete the other programs.  She identifies having been court ordered to treatment previously.        information provided here were obtained from chart review, my personal observation  and report from patient.              Diagnoses:    Depression, unspecified depression type  Suicidal ideation  Polysubstance abuse (H)         Labs:            Consults:   none         Hospital Course:     Evita Cornelius was admitted to [facility] with attending Slim Portillo MD under the direct care of DEBBIE Ley. The patient was placed under status 15 (15 minute checks) to ensure patient safety.     Levothyroxine 200 mcg was restarted, patient was not on any psychotic medication and refused to be started on any medications. She was non compliant with assessments, rude,  disrespectful and anxious. She continues to perseverates on wanting to go home eventhough she was just admitted today. States her main reason to " come to the hospital was to get her medication started. Encouraged pt to stay at the hospital for a couple of days for stabilization  and medication management but she declined.  I was page at about 1500 that patient signed a 12 hr intent to live. Staff encouraged pt to stay to no avail. She discharged home AGAINST MEDICAL ADVICE. She was discharged with a 30  Days supply of levothyroxine 200 mcg.     Evita Cornelius did not participate in groups and was not visible in the milieu. Her symptoms of depression did not improve. She however denies thoughts of suicide upon discharge and contracts for safety out in the community.      At the time of discharge Evita Cornelius was determined to not be at danger to herself or others.  Patient signed a 12 hr intent to leave and left AMA. Patient was not medically stable for discharge but was not holdable as she was not at risk to her self or others. Patient would have benefited from a couple more days of hospitalization for stabilization and medication management.  No appointments were made as patient left without proper assessment .         Discharge Medications:       Discharge Medication List as of 11/24/2021  4:09 PM      CONTINUE these medications which have CHANGED    Details   levothyroxine (SYNTHROID/LEVOTHROID) 200 MCG tablet Take 1 tablet (200 mcg) by mouth every morning (before breakfast), Disp-30 tablet, R-0, E-Prescribe                  Psychiatric Examination:   Appearance: awake, alert, dressed in hospital scrubs and disheveled   Attitude:  guarded and less cooperative  Eye Contact:  poor   Mood:  angry, anxious, sad  and depressed  Affect:  mood congruent  Speech:  clear, coherent  Language: fluent and intact in English  Psychomotor, Gait, Musculoskeletal:  no evidence of tardive dyskinesia, dystonia, or tics  Thought Process:  linear  Associations:  no loose associations  Thought Content:  passive suicidal ideation present  Insight:  limited  Judgement:   limited  Oriented to:  time, person, and place  Attention Span and Concentration:  limited  Recent and Remote Memory:  limited  Fund of Knowledge:  appropriate          Discharge Plan:     DISCHARGED AGAINST MEDICAL ADVICE to home    General Medication Instructions:     See your medication sheet(s) for instructions.     Take all medicines as directed.  Make no changes unless your doctor suggests them.     Go to all your doctor visits.    Be sure to have all your required lab tests. This way, your medicines can be refilled on time.    Do not use any drugs not prescribed by your doctor.    Avoid alcohol.    Health Care Follow-up:     If no appointments scheduled, explain: You signed a 12 hour intent to leave form and are being discharged AMA before team was able to schedule outpatient appointments.    Attend all scheduled appointments with your outpatient providers. Call at least 24 hours in advance if you need to reschedule an appointment to ensure continued access to your outpatient providers.     Major Treatments, Procedures and Findings:  You were provided with: a psychiatric assessment, assessed for medical stability and medication evaluation and/or management    Symptoms to Report: Feeling more aggressive, increased confusion, losing more sleep, mood getting worse, or thoughts of suicide.    Early warning signs can include: Increased depression or anxiety sleep disturbances increased thoughts or behaviors of suicide or self-harm  increased unusual thinking, such as paranoia or hearing voices.    Safety and Wellness: Take all medicines as directed. Make no changes unless your doctor suggests them. Follow treatment recommendations. Refrain from alcohol and non-prescribed drugs. Ask your support system to help you reduce your access to items that could harm yourself or others. If there is a concern for safety, call 911.    Resources:   Mental Health Resources:   -National [place] on Mental Illness (JOANN)  "[place]: Connect for help, to navigate the mental health system, and for support and for resources. Call: 6-346-WAXX-Helps / 0-677-852-2948  -Crisis Text Line: The 24/7 emergency service is available if you or someone you know is experiencing a psychiatric or mental health crisis. Text: \"[place]\" to 018138  -[place] Warmline: Are you an adult needing support? Talk to a specialist who has firsthand experience living with a mental health condition. Call: 922.166.5338  Text: \"Support\" to 96123  -National Suicide Prevention Lifeline: The 24/7 lifeline provides support when in distress, has prevention and crisis resources for you or your loved ones, and resources for professionals.  Call 4-438-448-TALK (4161)  -Peer Support Connection Warmlines: Hsvm-dx-khxq telephone support that s safe and supportive. Open 5 p.m. to 9 a.m. Call or text: 1-648.680.4245   -COVID Cares Stress Phone Support Service. Any [place] experiencing stress can call 155-CSJN5DD (501-416-5170) for free telephone support from 9am to 9pm every day. The service is a collaboration with volunteers from the [place] Psychiatric Society, the [place] Psychological Association, the [place] [employer], and [facility] [place]. The free service is also accessible at [employer] where searchers can also find psychiatric and mental health services availability and real-time Substance Use Disorder Treatment program   openings.  -[facility] Crisis (COPE) Response - Adult 248 054-0026    Outpatient Therapy Resources: (many of these clinics/organizations also offer outpatient psychiatry and medication management services - call to inquire)  -[facility] -  977.590.4624  https://www.ams AG.Bonegrafix/care/specialty/mental-behavioral-health/  https://www.ams AG.Bonegrafix/care/find/doctors/psychologists/  -[facility] - " https://www.[facility].org/treatments/Counseling-Adult  -[place] [facility] - https://[facility].com/  -[facility] - (796.269.4994)  https://[facility]-[place].com/  -[facility] - (1-615.391.5764) https://www.Afraxis.365Scores/  -[facility] - https://www.[facility].365Scores/  -[facility] - https://[facility]/  -[facility] - https://walkin.org/    Advance Directives:   Scanned document on file with [employer]? No scanned doc  Is document scanned? No. Copy Requested.  Honoring Choices Your Rights Handout: Informed and given  Was more information offered? Pt declined    The Treatment team has appreciated the opportunity to work with you. If you have any questions or concerns about your recent admission, you can contact the unit which can receive your call 24 hours a day, 7 days a week. They will be able to get in touch with a Provider if needed. The unit number is [phone].     Attestation:    The patient was seen and evaluated by me. I spent more than 30 minutes on discharge day activities. DEBBIE Ley    Date : 11/24/2021  Time: 6374

## 2022-01-15 ENCOUNTER — HEALTH MAINTENANCE LETTER (OUTPATIENT)
Age: 32
End: 2022-01-15

## 2022-12-26 ENCOUNTER — HEALTH MAINTENANCE LETTER (OUTPATIENT)
Age: 32
End: 2022-12-26

## 2023-04-16 ENCOUNTER — HEALTH MAINTENANCE LETTER (OUTPATIENT)
Age: 33
End: 2023-04-16

## 2024-02-21 ENCOUNTER — ANCILLARY PROCEDURE (OUTPATIENT)
Dept: GENERAL RADIOLOGY | Facility: CLINIC | Age: 34
End: 2024-02-21
Attending: STUDENT IN AN ORGANIZED HEALTH CARE EDUCATION/TRAINING PROGRAM
Payer: COMMERCIAL

## 2024-02-21 ENCOUNTER — OFFICE VISIT (OUTPATIENT)
Dept: FAMILY MEDICINE | Facility: CLINIC | Age: 34
End: 2024-02-21
Payer: COMMERCIAL

## 2024-02-21 VITALS
TEMPERATURE: 98.5 F | SYSTOLIC BLOOD PRESSURE: 100 MMHG | BODY MASS INDEX: 30.7 KG/M2 | HEIGHT: 66 IN | RESPIRATION RATE: 16 BRPM | OXYGEN SATURATION: 97 % | HEART RATE: 64 BPM | DIASTOLIC BLOOD PRESSURE: 62 MMHG | WEIGHT: 191 LBS

## 2024-02-21 DIAGNOSIS — Z12.4 CERVICAL CANCER SCREENING: ICD-10-CM

## 2024-02-21 DIAGNOSIS — M54.50 ACUTE RIGHT-SIDED LOW BACK PAIN WITHOUT SCIATICA: ICD-10-CM

## 2024-02-21 DIAGNOSIS — Z11.59 NEED FOR HEPATITIS C SCREENING TEST: ICD-10-CM

## 2024-02-21 DIAGNOSIS — F41.1 GENERALIZED ANXIETY DISORDER: ICD-10-CM

## 2024-02-21 DIAGNOSIS — F33.1 MODERATE RECURRENT MAJOR DEPRESSION (H): ICD-10-CM

## 2024-02-21 DIAGNOSIS — Z13.220 SCREENING FOR HYPERLIPIDEMIA: ICD-10-CM

## 2024-02-21 DIAGNOSIS — Z13.1 SCREENING FOR DIABETES MELLITUS: ICD-10-CM

## 2024-02-21 DIAGNOSIS — F17.210 CIGARETTE SMOKER: ICD-10-CM

## 2024-02-21 DIAGNOSIS — Z00.00 ROUTINE GENERAL MEDICAL EXAMINATION AT A HEALTH CARE FACILITY: Primary | ICD-10-CM

## 2024-02-21 DIAGNOSIS — E03.9 HYPOTHYROIDISM, UNSPECIFIED TYPE: ICD-10-CM

## 2024-02-21 DIAGNOSIS — F31.62 BIPOLAR DISORDER, CURRENT EPISODE MIXED, MODERATE (H): ICD-10-CM

## 2024-02-21 DIAGNOSIS — E03.8 OTHER SPECIFIED HYPOTHYROIDISM: ICD-10-CM

## 2024-02-21 DIAGNOSIS — Z79.899 MEDICATION MANAGEMENT: ICD-10-CM

## 2024-02-21 DIAGNOSIS — F51.01 PRIMARY INSOMNIA: ICD-10-CM

## 2024-02-21 DIAGNOSIS — Z11.59 NEED FOR HEPATITIS B SCREENING TEST: ICD-10-CM

## 2024-02-21 PROBLEM — R87.610 ATYPICAL SQUAMOUS CELLS OF UNDETERMINED SIGNIFICANCE ON CYTOLOGIC SMEAR OF CERVIX (ASC-US): Status: ACTIVE | Noted: 2020-10-28

## 2024-02-21 LAB
ALBUMIN SERPL BCG-MCNC: 4.4 G/DL (ref 3.5–5.2)
ALP SERPL-CCNC: 67 U/L (ref 40–150)
ALT SERPL W P-5'-P-CCNC: 14 U/L (ref 0–50)
ANION GAP SERPL CALCULATED.3IONS-SCNC: 8 MMOL/L (ref 7–15)
AST SERPL W P-5'-P-CCNC: 24 U/L (ref 0–45)
BILIRUB SERPL-MCNC: 0.6 MG/DL
BUN SERPL-MCNC: 14.5 MG/DL (ref 6–20)
CALCIUM SERPL-MCNC: 9.2 MG/DL (ref 8.6–10)
CHLORIDE SERPL-SCNC: 102 MMOL/L (ref 98–107)
CHOLEST SERPL-MCNC: 182 MG/DL
CREAT SERPL-MCNC: 0.93 MG/DL (ref 0.51–0.95)
DEPRECATED HCO3 PLAS-SCNC: 25 MMOL/L (ref 22–29)
EGFRCR SERPLBLD CKD-EPI 2021: 83 ML/MIN/1.73M2
ERYTHROCYTE [DISTWIDTH] IN BLOOD BY AUTOMATED COUNT: 13.2 % (ref 10–15)
GLUCOSE SERPL-MCNC: 93 MG/DL (ref 70–99)
HBA1C MFR BLD: 5.5 % (ref 0–5.6)
HBV CORE AB SERPL QL IA: NONREACTIVE
HBV SURFACE AB SERPL IA-ACNC: 55.9 M[IU]/ML
HBV SURFACE AB SERPL IA-ACNC: REACTIVE M[IU]/ML
HBV SURFACE AG SERPL QL IA: NONREACTIVE
HCT VFR BLD AUTO: 43.4 % (ref 35–47)
HCV AB SERPL QL IA: NONREACTIVE
HDLC SERPL-MCNC: 50 MG/DL
HGB BLD-MCNC: 14.1 G/DL (ref 11.7–15.7)
LDLC SERPL CALC-MCNC: 120 MG/DL
MCH RBC QN AUTO: 30.5 PG (ref 26.5–33)
MCHC RBC AUTO-ENTMCNC: 32.5 G/DL (ref 31.5–36.5)
MCV RBC AUTO: 94 FL (ref 78–100)
NONHDLC SERPL-MCNC: 132 MG/DL
PLATELET # BLD AUTO: 242 10E3/UL (ref 150–450)
POTASSIUM SERPL-SCNC: 4.7 MMOL/L (ref 3.4–5.3)
PROT SERPL-MCNC: 7.6 G/DL (ref 6.4–8.3)
RBC # BLD AUTO: 4.62 10E6/UL (ref 3.8–5.2)
SODIUM SERPL-SCNC: 135 MMOL/L (ref 135–145)
T4 FREE SERPL-MCNC: 0.8 NG/DL (ref 0.9–1.7)
TRIGL SERPL-MCNC: 61 MG/DL
TSH SERPL DL<=0.005 MIU/L-ACNC: 15 UIU/ML (ref 0.3–4.2)
WBC # BLD AUTO: 7.7 10E3/UL (ref 4–11)

## 2024-02-21 PROCEDURE — 99214 OFFICE O/P EST MOD 30 MIN: CPT | Mod: 25 | Performed by: STUDENT IN AN ORGANIZED HEALTH CARE EDUCATION/TRAINING PROGRAM

## 2024-02-21 PROCEDURE — 99385 PREV VISIT NEW AGE 18-39: CPT | Performed by: STUDENT IN AN ORGANIZED HEALTH CARE EDUCATION/TRAINING PROGRAM

## 2024-02-21 PROCEDURE — 36415 COLL VENOUS BLD VENIPUNCTURE: CPT | Performed by: STUDENT IN AN ORGANIZED HEALTH CARE EDUCATION/TRAINING PROGRAM

## 2024-02-21 PROCEDURE — 84443 ASSAY THYROID STIM HORMONE: CPT | Performed by: STUDENT IN AN ORGANIZED HEALTH CARE EDUCATION/TRAINING PROGRAM

## 2024-02-21 PROCEDURE — 84439 ASSAY OF FREE THYROXINE: CPT | Performed by: STUDENT IN AN ORGANIZED HEALTH CARE EDUCATION/TRAINING PROGRAM

## 2024-02-21 PROCEDURE — 86706 HEP B SURFACE ANTIBODY: CPT | Performed by: STUDENT IN AN ORGANIZED HEALTH CARE EDUCATION/TRAINING PROGRAM

## 2024-02-21 PROCEDURE — 86803 HEPATITIS C AB TEST: CPT | Performed by: STUDENT IN AN ORGANIZED HEALTH CARE EDUCATION/TRAINING PROGRAM

## 2024-02-21 PROCEDURE — 87624 HPV HI-RISK TYP POOLED RSLT: CPT | Performed by: STUDENT IN AN ORGANIZED HEALTH CARE EDUCATION/TRAINING PROGRAM

## 2024-02-21 PROCEDURE — G0145 SCR C/V CYTO,THINLAYER,RESCR: HCPCS | Performed by: STUDENT IN AN ORGANIZED HEALTH CARE EDUCATION/TRAINING PROGRAM

## 2024-02-21 PROCEDURE — 87340 HEPATITIS B SURFACE AG IA: CPT | Performed by: STUDENT IN AN ORGANIZED HEALTH CARE EDUCATION/TRAINING PROGRAM

## 2024-02-21 PROCEDURE — 80061 LIPID PANEL: CPT | Performed by: STUDENT IN AN ORGANIZED HEALTH CARE EDUCATION/TRAINING PROGRAM

## 2024-02-21 PROCEDURE — 80053 COMPREHEN METABOLIC PANEL: CPT | Performed by: STUDENT IN AN ORGANIZED HEALTH CARE EDUCATION/TRAINING PROGRAM

## 2024-02-21 PROCEDURE — 72100 X-RAY EXAM L-S SPINE 2/3 VWS: CPT | Mod: TC | Performed by: RADIOLOGY

## 2024-02-21 PROCEDURE — 83036 HEMOGLOBIN GLYCOSYLATED A1C: CPT | Performed by: STUDENT IN AN ORGANIZED HEALTH CARE EDUCATION/TRAINING PROGRAM

## 2024-02-21 PROCEDURE — 86704 HEP B CORE ANTIBODY TOTAL: CPT | Performed by: STUDENT IN AN ORGANIZED HEALTH CARE EDUCATION/TRAINING PROGRAM

## 2024-02-21 PROCEDURE — 85027 COMPLETE CBC AUTOMATED: CPT | Performed by: STUDENT IN AN ORGANIZED HEALTH CARE EDUCATION/TRAINING PROGRAM

## 2024-02-21 RX ORDER — QUETIAPINE FUMARATE 25 MG/1
25 TABLET, FILM COATED ORAL AT BEDTIME
Qty: 30 TABLET | Refills: 1 | Status: SHIPPED | OUTPATIENT
Start: 2024-02-21 | End: 2024-04-24

## 2024-02-21 RX ORDER — PAROXETINE 30 MG/1
30 TABLET, FILM COATED ORAL EVERY MORNING
Qty: 90 TABLET | Refills: 1 | Status: SHIPPED | OUTPATIENT
Start: 2024-02-21 | End: 2024-06-05

## 2024-02-21 RX ORDER — PREDNISONE 20 MG/1
40 TABLET ORAL DAILY
Qty: 10 TABLET | Refills: 0 | Status: SHIPPED | OUTPATIENT
Start: 2024-02-21 | End: 2024-02-26

## 2024-02-21 RX ORDER — LEVOTHYROXINE SODIUM 200 UG/1
200 TABLET ORAL
Qty: 90 TABLET | Refills: 3 | Status: SHIPPED | OUTPATIENT
Start: 2024-02-21 | End: 2024-06-06

## 2024-02-21 SDOH — HEALTH STABILITY: PHYSICAL HEALTH: ON AVERAGE, HOW MANY DAYS PER WEEK DO YOU ENGAGE IN MODERATE TO STRENUOUS EXERCISE (LIKE A BRISK WALK)?: 0 DAYS

## 2024-02-21 ASSESSMENT — ANXIETY QUESTIONNAIRES
GAD7 TOTAL SCORE: 9
4. TROUBLE RELAXING: MORE THAN HALF THE DAYS
5. BEING SO RESTLESS THAT IT IS HARD TO SIT STILL: SEVERAL DAYS
IF YOU CHECKED OFF ANY PROBLEMS ON THIS QUESTIONNAIRE, HOW DIFFICULT HAVE THESE PROBLEMS MADE IT FOR YOU TO DO YOUR WORK, TAKE CARE OF THINGS AT HOME, OR GET ALONG WITH OTHER PEOPLE: NOT DIFFICULT AT ALL
7. FEELING AFRAID AS IF SOMETHING AWFUL MIGHT HAPPEN: MORE THAN HALF THE DAYS
1. FEELING NERVOUS, ANXIOUS, OR ON EDGE: SEVERAL DAYS
6. BECOMING EASILY ANNOYED OR IRRITABLE: SEVERAL DAYS
7. FEELING AFRAID AS IF SOMETHING AWFUL MIGHT HAPPEN: MORE THAN HALF THE DAYS
2. NOT BEING ABLE TO STOP OR CONTROL WORRYING: SEVERAL DAYS
3. WORRYING TOO MUCH ABOUT DIFFERENT THINGS: SEVERAL DAYS
8. IF YOU CHECKED OFF ANY PROBLEMS, HOW DIFFICULT HAVE THESE MADE IT FOR YOU TO DO YOUR WORK, TAKE CARE OF THINGS AT HOME, OR GET ALONG WITH OTHER PEOPLE?: NOT DIFFICULT AT ALL

## 2024-02-21 ASSESSMENT — PATIENT HEALTH QUESTIONNAIRE - PHQ9
10. IF YOU CHECKED OFF ANY PROBLEMS, HOW DIFFICULT HAVE THESE PROBLEMS MADE IT FOR YOU TO DO YOUR WORK, TAKE CARE OF THINGS AT HOME, OR GET ALONG WITH OTHER PEOPLE: NOT DIFFICULT AT ALL
SUM OF ALL RESPONSES TO PHQ QUESTIONS 1-9: 9
SUM OF ALL RESPONSES TO PHQ QUESTIONS 1-9: 9

## 2024-02-21 ASSESSMENT — SOCIAL DETERMINANTS OF HEALTH (SDOH): HOW OFTEN DO YOU GET TOGETHER WITH FRIENDS OR RELATIVES?: MORE THAN THREE TIMES A WEEK

## 2024-02-21 NOTE — PROGRESS NOTES
Preventive Care Visit  [facility] KAY Garay MD, Family Medicine  Feb 21, 2024    Assessment & Plan     Routine general medical examination at a health care facility    - DEPRESSION ACTION PLAN (DAP)    Other specified hypothyroidism  No recent lab.  No concerns at this time  - TSH WITH FREE T4 REFLEX; Future  - TSH with free T4 reflex; Future  - CBC with platelets; Future  - Comprehensive metabolic panel (BMP + Alb, Alk Phos, ALT, AST, Total. Bili, TP); Future  - TSH WITH FREE T4 REFLEX  - CBC with platelets  - Comprehensive metabolic panel (BMP + Alb, Alk Phos, ALT, AST, Total. Bili, TP)  -LevoThyroxine 200 mcg 1 tablet daily before breakfast  Primary insomnia  Uncontrolled, stop trazodone  - QUEtiapine (SEROQUEL) 25 MG tablet; Take 1 tablet (25 mg) by mouth at bedtime    Bipolar disorder, current episode mixed, moderate (H)  Review of medical records showed the patient was admitted to the psychiatry unit in 2021. it is reasonable to start antipsychotic as monotherapy with selective serotonin reuptake inhibitor can exacerbate BPD  - PARoxetine (PAXIL) 30 MG tablet; Take 1 tablet (30 mg) by mouth every morning  - start QUEtiapine (SEROQUEL) 25 MG tablet; Take 1 tablet (25 mg) by mouth at bedtime will titrate up slowly  Patient declined referral to psych.  Acute right-sided low back pain without sciatica    - predniSONE (DELTASONE) 20 MG tablet; Take 2 tablets (40 mg) by mouth daily for 5 days With breakfast  - XR Lumbar Spine 2/3 Views; Future    Generalized anxiety disorder  stable  - PARoxetine (PAXIL) 30 MG tablet; Take 1 tablet (30 mg) by mouth every morning  - QUEtiapine (SEROQUEL) 25 MG tablet; Take 1 tablet (25 mg) by mouth at bedtime    Moderate recurrent major depression (H)  stable  - PARoxetine (PAXIL) 30 MG tablet; Take 1 tablet (30 mg) by mouth every morning  - QUEtiapine (SEROQUEL) 25 MG tablet; Take 1 tablet (25 mg) by mouth at bedtime  - DEPRESSION ACTION PLAN  "(DAP)    Need for hepatitis C screening test    - Hepatitis C Screen Reflex to HCV RNA Quant and Genotype; Future  - Hepatitis C Screen Reflex to HCV RNA Quant and Genotype    Cervical cancer screening    - Pap Screen with HPV - recommended age 30 - 65 years    Need for hepatitis B screening test    - Hepatitis B surface antigen; Future  - Hepatitis B core antibody; Future  - Hepatitis B Surface Antibody; Future  Screening hyperlipidemia  Lipid lab  Screening for diabetes  Hemoglobin A1c  Cigarette smoker  Smoking cessation counseled.   Patient has been advised of split billing requirements and indicates understanding: Yes  Review of external notes as documented elsewhere in note  Ordering of each unique test  Prescription drug management        Nicotine/Tobacco Cessation  She reports that she has been smoking cigarettes. She has a 7.5 pack-year smoking history. She has never used smokeless tobacco.  Nicotine/Tobacco Cessation Plan  Self help information given to patient      BMI  Estimated body mass index is 30.83 kg/m  as calculated from the following:    Height as of this encounter: 1.676 m (5' 6\").    Weight as of this encounter: 86.6 kg (191 lb).   Weight management plan: Discussed healthy diet and exercise guidelines    Counseling  Appropriate preventive services were discussed with this patient, including applicable screening as appropriate for fall prevention, nutrition, physical activity, Tobacco-use cessation, weight loss and cognition.  Checklist reviewing preventive services available has been given to the patient.  Reviewed patient's diet, addressing concerns and/or questions.   The patient was instructed to see the dentist every 6 months.   The patient's PHQ-9 score is consistent with mild depression. She was provided with information regarding depression.           Shin Jama is a 34 year old, presenting for the following:  Physical        2/21/2024     7:59 AM   Additional Questions   Roomed " by Kala PEREIRA         2/21/2024     7:59 AM   Patient Reported Additional Medications   Patient reports taking the following new medications Paroxetine 30mg and Levothyroxine 150mcg daily          HPI    Depression and Anxiety Follow-Up  How are you doing with your depression since your last visit? stable  How are you doing with your anxiety since your last visit?  stable  Are you having other symptoms that might be associated with depression or anxiety? No  Have you had a significant life event? No   Do you have any concerns with your use of alcohol or other drugs? No  Patient has a history of bipolar and polysubstance abuse and she was recently released from [facility]. While incarcerated, she received paroxetine and trazodone treatment. Presently, she is taking 100 mg of trazodone but reports persistent insomnia. Additionally, she had been prescribed levothyroxine for hypothyroidism prior to her incarceration.  Patient is a smoker.  Social History     Tobacco Use    Smoking status: Every Day     Packs/day: 0.50     Years: 15.00     Additional pack years: 0.00     Total pack years: 7.50     Types: Cigarettes    Smokeless tobacco: Never    Tobacco comments:     1/2 PPD   Substance Use Topics    Alcohol use: No    Drug use: Yes     Types: Methamphetamines     Comment: smoking meth, last use 3/16/20         5/29/2018     3:07 PM 4/24/2019     2:39 PM 2/21/2024     7:37 AM   PHQ   PHQ-9 Total Score 0 6 9   Q9: Thoughts of better off dead/self-harm past 2 weeks Not at all Not at all Not at all         4/24/2019     2:39 PM 3/20/2020     7:28 AM 2/21/2024     7:38 AM   JOSE-7 SCORE   Total Score  7 (mild anxiety) 9 (mild anxiety)   Total Score 7 7 9         2/21/2024     7:37 AM   Last PHQ-9   1.  Little interest or pleasure in doing things 1   2.  Feeling down, depressed, or hopeless 1   3.  Trouble falling or staying asleep, or sleeping too much 3   4.  Feeling tired or having little energy 1   5.  Poor appetite or  overeating 1   6.  Feeling bad about yourself 1   7.  Trouble concentrating 1   8.  Moving slowly or restless 0   Q9: Thoughts of better off dead/self-harm past 2 weeks 0   PHQ-9 Total Score 9         2/21/2024     7:38 AM   JOSE-7    1. Feeling nervous, anxious, or on edge 1   2. Not being able to stop or control worrying 1   3. Worrying too much about different things 1   4. Trouble relaxing 2   5. Being so restless that it is hard to sit still 1   6. Becoming easily annoyed or irritable 1   7. Feeling afraid, as if something awful might happen 2   JOSE-7 Total Score 9   If you checked any problems, how difficult have they made it for you to do your work, take care of things at home, or get along with other people? Not difficult at all           Hypothyroidism Follow-up    Since last visit, patient describes the following symptoms: Weight stable, no hair loss, no skin changes, no constipation, no loose stools  Acute Back Pain Follow Up  Twisted back while lifting an object 2 weeks ago  . She has been undergoing chiropractor without improvement.  Where is your back pain located? (Select all that apply) low back left  How would you describe your back pain?  sharp  Where does your back pain spread? the right buttock  Since your last clinic visit for back pain, how has your pain changed? unchanged  Does your back pain interfere with your job? sometimes  Since your last visit, have you tried any new treatment? Yes -  chiropractor      2/21/2024   General Health   How would you rate your overall physical health? (!) POOR   Feel stress (tense, anxious, or unable to sleep) Very much   (!) STRESS CONCERN      2/21/2024   Nutrition   Three or more servings of calcium each day? (!) NO   Diet: Regular (no restrictions)   How many servings of fruit and vegetables per day? (!) 0-1   How many sweetened beverages each day? (!) 3         2/21/2024   Exercise   Days per week of moderate/strenous exercise 0 days   (!) EXERCISE  CONCERN      2/21/2024   Social Factors   Frequency of gathering with friends or relatives More than three times a week   Worry food won't last until get money to buy more No   Food not last or not have enough money for food? No   Do you have housing?  Yes   Are you worried about losing your housing? No   Lack of transportation? No   Unable to get utilities (heat,electricity)? No         2/21/2024   Dental   Dentist two times every year? (!) NO         2/21/2024   TB Screening   Were you born outside of [place]?  (!) YES         Today's PHQ-9 Score:       2/21/2024     7:37 AM   PHQ-9 SCORE   PHQ-9 Total Score MyChart 9 (Mild depression)   PHQ-9 Total Score 9         2/21/2024   Substance Use   Alcohol more than 3/day or more than 7/wk No   Do you use any other substances recreationally? No     Social History     Tobacco Use    Smoking status: Every Day     Packs/day: 0.50     Years: 15.00     Additional pack years: 0.00     Total pack years: 7.50     Types: Cigarettes    Smokeless tobacco: Never    Tobacco comments:     1/2 PPD   Substance Use Topics    Alcohol use: No    Drug use: Yes     Types: Methamphetamines     Comment: smoking meth, last use 3/16/20             2/21/2024   Breast Cancer Screening   Family history of breast, colon, or ovarian cancer? No / Unknown      Mammogram Screening - Patient under 40 years of age: Routine Mammogram Screening not recommended.         2/21/2024   STI Screening   New sexual partner(s) since last STI/HIV test? (!) YES      History of abnormal Pap smear: YES - updated in Problem List and Health Maintenance accordingly        3/1/2017     3:25 PM 2/26/2014    12:00 AM 10/24/2012     3:21 PM   PAP / HPV   PAP (Historical) NIL  NIL  NIL            2/21/2024   Contraception/Family Planning   Questions about contraception or family planning No       Reviewed and updated as needed this visit by Provider                    Past Medical History:   Diagnosis Date    GERD (gastroesophageal  "reflux disease) 2011    Ingrowing nail     resection x2 left great toe     Moderate major depression (H) 2009    Pain     Pain     Papanicolaou smear of cervix with low grade squamous intraepithelial lesion (LGSIL) 2010    Thyroid disease      Past Surgical History:   Procedure Laterality Date    ZZC FOOT/TOES SURGERY PROC UNLISTED       OB History    Para Term  AB Living   2 2 1 0 0 2   SAB IAB Ectopic Multiple Live Births   0 0 0 0 2      # Outcome Date GA Lbr Josiah/2nd Weight Sex Delivery Anes PTL Lv   2 Term 14 40w4d 02:40 / 00:05 3.6 kg (7 lb 15 oz) M Vag-Spont EPI N FAHAD      Birth Comments: none      Apgar1: 9  Apgar5: 9   1 Para 11 41w0d 13:00 3.856 kg (8 lb 8 oz) F   N FAHAD      Birth Comments: Uncomplicated       Name: Francine         Review of Systems  Constitutional, HEENT, cardiovascular, pulmonary, GI, , musculoskeletal, neuro, skin, endocrine and psych systems are negative, except as otherwise noted.     Objective    Exam  /62 (BP Location: Left arm, Patient Position: Chair, Cuff Size: Adult Regular)   Pulse 64   Temp 98.5  F (36.9  C) (Oral)   Resp 16   Ht 1.676 m (5' 6\")   Wt 86.6 kg (191 lb)   LMP 2024   SpO2 97%   BMI 30.83 kg/m     Estimated body mass index is 30.83 kg/m  as calculated from the following:    Height as of this encounter: 1.676 m (5' 6\").    Weight as of this encounter: 86.6 kg (191 lb).    Physical Exam  GENERAL: alert and no distress  EYES: Eyes grossly normal to inspection, PERRL and conjunctivae and sclerae normal  HENT: ear canals and TM's normal, nose and mouth without ulcers or lesions  NECK: no adenopathy, no asymmetry, masses, or scars  RESP: lungs clear to auscultation - no rales, rhonchi or wheezes  CV: regular rate and rhythm, normal S1 S2, no S3 or S4, no murmur, click or rub, no peripheral edema  ABDOMEN: soft, nontender, no hepatosplenomegaly, no masses and bowel sounds normal  MS: Tissue " texture changes of the muscle overlying the right lumbar spine, discomfort with spine extension , no erythema, no drainage, no rash sensation and strength intact   NEURO: Normal strength and tone, mentation intact and speech normal  PSYCH: mentation appears normal, affect normal/bright   (female): normal female external genitalia, normal urethral meatus, vaginal mucosa pink, moist, well rugated, normal cervix/adnexa/uterus without masses or discharge,non-erythematous,  A Pap smear was performed.Perineum was normal on inspection.      Signed Electronically by: Alejandra Garay MD

## 2024-02-21 NOTE — PATIENT INSTRUCTIONS
When You Have Low Back Pain    Caring for Your Back  You are not alone.    Low back pain is very common. Nearly half of all adults have low back pain in any given year. The good news is that back pain is rarely a danger to your health. Most people can manage their back pain on their own and about half of them start feeling better within 2 weeks. In 9 out of 10 cases, low back pain goes away or no longer limits daily activity within 6 weeks.     Your outlook is good!    Your symptoms tell us that your low back pain is most likely not a danger to you. Most of the time we do not know the exact cause of low back pain, even if you see a doctor or have an MRI. However, treatment can still work without knowing the cause of the pain. Less than 1 in 100 people need surgery for their back pain.     What can I do about my low back pain?     There are three things you can do to ease low back pain and help it go away.   Use heat or cold packs.   Take medicine as directed.   Use positions, movements and exercises.     Using heat or cold packs    Try cold packs or gentle heat to ease your pain. Use whichever gives you the most relief. Apply the cold pack or heat for 15 minutes at a time, as often as needed.    Taking medicine     If your doctor has prescribed medicine, be sure to follow the directions.   If you take over-the-counter medicine, read and follow the directions.   Talk to your doctor if you have any questions.     Using positions, movements and exercises    Research tells us that moving your joints and muscles can help you recover from back pain. Such activity should be simple and gentle. Use the positions in the photos as well as walking to help relieve your pain. Try taking a short walk every 3 to 4 hours during the day. Walk for a few minutes inside your home or take longer walks outside, on a treadmill or at a mall. Slowly increase the amount of time you walk. Expect discomfort when you begin, but it should  lessen as your back starts to heal. When your back feels better, walk daily to keep your back and body healthy.    Finding a comfortable position    When your back pain is new, certain positions will ease your pain. Gently try each of the positions below until you find one that is helpful. Once you find a position of comfort, use it as often as you like when you are resting. You will recover faster if you combine rest with activity.         Lie on your back with your legs bent. You can do this by placing a pillow under your knees. Or you may lie on the floor and rest your lower legs on the seat of a chair.       Lie on your side with your knees bent, and place a pillow between your knees.       Lie on your stomach over pillows.      When should I call my doctor?    Your back pain should improve over the first couple of weeks. As it improves, you should be able to return to your normal activities. But call your doctor if:   You have a sudden change in your ability to control your bladder or bowels.   You feel tingling in your groin or legs.   The pain spreads down your leg and into your foot.   Your toes, feet or leg muscles feel weak.   You feel generally unwell or sick.   Your pain does not get better or gets worse.      If you are deaf or hard of hearing, please let us know. We provide many free services including sign language interpreters,oral interpreters, TTYs, telephone amplifiers, note takers and written materials.    For informational purposes only. Not to replace the advice of your health care provider. Copyright   2013 [facility]. All rights reserved. [employer] [id number] - Rev 06/14.    Preventive Care Advice   This is general advice given by our system to help you stay healthy. However, your care team may have specific advice just for you. Please talk to your care team about your preventive care needs.  Nutrition  Eat 5 or more servings of fruits and vegetables each day.  Try wheat bread,  brown rice and whole grain pasta (instead of white bread, rice, and pasta).  Get enough calcium and vitamin D. Check the label on foods and aim for 100% of the RDA (recommended daily allowance).  Lifestyle  Exercise at least 150 minutes each week  (30 minutes a day, 5 days a week).  Do muscle strengthening activities 2 days a week. These help control your weight and prevent disease.  No smoking.  Wear sunscreen to prevent skin cancer.  Have a dental exam and cleaning every 6 months.  Yearly exams  See your health care team every year to talk about:  Any changes in your health.  Any medicines your care team has prescribed.  Preventive care, family planning, and ways to prevent chronic diseases.  Shots (vaccines)   HPV shots (up to age 26), if you've never had them before.  Hepatitis B shots (up to age 59), if you've never had them before.  COVID-19 shot: Get this shot when it's due.  Flu shot: Get a flu shot every year.  Tetanus shot: Get a tetanus shot every 10 years.  Pneumococcal, hepatitis A, and RSV shots: Ask your care team if you need these based on your risk.  Shingles shot (for age 50 and up)  General health tests  Diabetes screening:  Starting at age 35, Get screened for diabetes at least every 3 years.  If you are younger than age 35, ask your care team if you should be screened for diabetes.  Cholesterol test: At age 39, start having a cholesterol test every 5 years, or more often if advised.  Bone density scan (DEXA): At age 50, ask your care team if you should have this scan for osteoporosis (brittle bones).  Hepatitis C: Get tested at least once in your life.  STIs (sexually transmitted infections)  Before age 24: Ask your care team if you should be screened for STIs.  After age 24: Get screened for STIs if you're at risk. You are at risk for STIs (including HIV) if:  You are sexually active with more than one person.  You don't use condoms every time.  You or a partner was diagnosed with a sexually  transmitted infection.  If you are at risk for HIV, ask about PrEP medicine to prevent HIV.  Get tested for HIV at least once in your life, whether you are at risk for HIV or not.  Cancer screening tests  Cervical cancer screening: If you have a cervix, begin getting regular cervical cancer screening tests starting at age 21.  Breast cancer scan (mammogram): If you've ever had breasts, begin having regular mammograms starting at age 40. This is a scan to check for breast cancer.  Colon cancer screening: It is important to start screening for colon cancer at age 45.  Have a colonoscopy test every 10 years (or more often if you're at risk) Or, ask your provider about stool tests like a FIT test every year or Cologuard test every 3 years.  To learn more about your testing options, visit:   https://www.[employer]/683630.pdf.  For help making a decision, visit:   https://[employer].Korbit/ta40667.  Prostate cancer screening test: If you have a prostate, ask your care team if a prostate cancer screening test (PSA) at age 55 is right for you.  Lung cancer screening: If you are a current or former smoker ages 50 to 80, ask your care team if ongoing lung cancer screenings are right for you.  For informational purposes only. Not to replace the advice of your health care provider. Copyright   2023 [facility]. All rights reserved. Clinically reviewed by the [facility] Transitions Program. [employer] [id number] - REV 01/24.    Learning About Stress  What is stress?     Stress is your body's response to a hard situation. Your body can have a physical, emotional, or mental response. Stress is a fact of life for most people, and it affects everyone differently. What causes stress for you may not be stressful for someone else.  A lot of things can cause stress. You may feel stress when you go on a job interview, take a test, or run a race. This kind of short-term stress is normal and even useful. It can help you if you need  to work hard or react quickly. For example, stress can help you finish an important job on time.  Long-term stress is caused by ongoing stressful situations or events. Examples of long-term stress include long-term health problems, ongoing problems at work, or conflicts in your family. Long-term stress can harm your health.  How does stress affect your health?  When you are stressed, your body responds as though you are in danger. It makes hormones that speed up your heart, make you breathe faster, and give you a burst of energy. This is called the fight-or-flight stress response. If the stress is over quickly, your body goes back to normal and no harm is done.  But if stress happens too often or lasts too long, it can have bad effects. Long-term stress can make you more likely to get sick, and it can make symptoms of some diseases worse. If you tense up when you are stressed, you may develop neck, shoulder, or low back pain. Stress is linked to high blood pressure and heart disease.  Stress also harms your emotional health. It can make you jerry, tense, or depressed. Your relationships may suffer, and you may not do well at work or school.  What can you do to manage stress?  You can try these things to help manage stress:   Do something active. Exercise or activity can help reduce stress. Walking is a great way to get started. Even everyday activities such as housecleaning or yard work can help.  Try yoga or alley chi. These techniques combine exercise and meditation. You may need some training at first to learn them.  Do something you enjoy. For example, listen to music or go to a movie. Practice your hobby or do volunteer work.  Meditate. This can help you relax, because you are not worrying about what happened before or what may happen in the future.  Do guided imagery. Imagine yourself in any setting that helps you feel calm. You can use online videos, books, or a teacher to guide you.  Do breathing exercises.  "For example:  From a standing position, bend forward from the waist with your knees slightly bent. Let your arms dangle close to the floor.  Breathe in slowly and deeply as you return to a standing position. Roll up slowly and lift your head last.  Hold your breath for just a few seconds in the standing position.  Breathe out slowly and bend forward from the waist.  Let your feelings out. Talk, laugh, cry, and express anger when you need to. Talking with supportive friends or family, a counselor, or a tova leader about your feelings is a healthy way to relieve stress. Avoid discussing your feelings with people who make you feel worse.  Write. It may help to write about things that are bothering you. This helps you find out how much stress you feel and what is causing it. When you know this, you can find better ways to cope.  What can you do to prevent stress?  You might try some of these things to help prevent stress:  Manage your time. This helps you find time to do the things you want and need to do.  Get enough sleep. Your body recovers from the stresses of the day while you are sleeping.  Get support. Your family, friends, and community can make a difference in how you experience stress.  Limit your news feed. Avoid or limit time on social media or news that may make you feel stressed.  Do something active. Exercise or activity can help reduce stress. Walking is a great way to get started.  Where can you learn more?  Go to https://www.[employer].net/patiented  Enter N032 in the search box to learn more about \"Learning About Stress.\"  Current as of: February 26, 2023               Content Version: 13.8    8188-2158 [employer].   Care instructions adapted under license by your healthcare professional. If you have questions about a medical condition or this instruction, always ask your healthcare professional. [employer] disclaims any warranty or liability for your use of this " information.      Learning About Depression Screening  What is depression screening?  Depression screening is a way to see if you have depression symptoms. It may be done by a doctor or counselor. It's often part of a routine checkup. That's because your mental health is just as important as your physical health.  Depression is a mental health condition that affects how you feel, think, and act. You may:  Have less energy.  Lose interest in your daily activities.  Feel sad and grouchy for a long time.  Depression is very common. It affects people of all ages.  Many things can lead to depression. Some people become depressed after they have a stroke or find out they have a major illness like cancer or heart disease. The death of a loved one or a breakup may lead to depression. It can run in families. Most experts believe that a combination of inherited genes and stressful life events can cause it.  What happens during screening?  You may be asked to fill out a form about your depression symptoms. You and the doctor will discuss your answers. The doctor may ask you more questions to learn more about how you think, act, and feel.  What happens after screening?  If you have symptoms of depression, your doctor will talk to you about your options.  Doctors usually treat depression with medicines or counseling. Often, combining the two works best. Many people don't get help because they think that they'll get over the depression on their own. But people with depression may not get better unless they get treatment.  The cause of depression is not well understood. There may be many factors involved. But if you have depression, it's not your fault.  A serious symptom of depression is thinking about death or suicide. If you or someone you care about talks about this or about feeling hopeless, get help right away.  It's important to know that depression can be treated. Medicine, counseling, and self-care may help.  Where can  "you learn more?  Go to https://www.[employer].net/patiented  Enter T185 in the search box to learn more about \"Learning About Depression Screening.\"  Current as of: June 25, 2023               Content Version: 13.8    1330-2938 [employer].   Care instructions adapted under license by your healthcare professional. If you have questions about a medical condition or this instruction, always ask your healthcare professional. [employer] disclaims any warranty or liability for your use of this information.      Safer Sex: Care Instructions  Overview  Safer sex is a way to reduce your risk of getting a sexually transmitted infection (STI). It can also help prevent pregnancy.  Several products can help you practice safer sex and reduce your chance of STIs. One of the best is a condom. There are internal and external condoms. You can use a special rubber sheet (dental dam) for protection during oral sex. Disposable gloves can keep your hands from touching blood, semen, or other body fluids that can carry infections.  Remember that birth control methods such as diaphragms, IUDs, foams, and birth control pills do not stop you from getting STIs.  Follow-up care is a key part of your treatment and safety. Be sure to make and go to all appointments, and call your doctor if you are having problems. It's also a good idea to know your test results and keep a list of the medicines you take.  How can you care for yourself at home?  Think about getting vaccinated to help prevent hepatitis A, hepatitis B, and human papillomavirus (HPV). They can be spread through sex.  Use a condom every time you have sex. Use an external condom, which goes on the penis. Or use an internal condom, which goes into the vagina or anus.  Make sure you use the right size external condom. A condom that's too small can break easily. A condom that's too big can slip off during sex.  Use a new condom each time you have sex. Be careful " "not to poke a hole in the condom when you open the wrapper.  Don't use an internal condom and an external condom at the same time.  Never use petroleum jelly (such as Vaseline), grease, hand lotion, baby oil, or anything with oil in it. These products can make holes in the condom.  After intercourse, hold the edge of the condom as you remove it. This will help keep semen from spilling out of the condom.  Do not have sex with anyone who has symptoms of an STI, such as sores on the genitals or mouth.  Do not drink a lot of alcohol or use drugs before sex.  Limit your sex partners. Sex with one partner who has sex only with you can reduce your risk of getting an STI.  Don't share sex toys. But if you do share them, use a condom and clean the sex toys between each use.  Talk to your partner(s) before you have sex. Talk about what you feel comfortable with and whether you have any boundaries with sex. And find out if your partner(s) may be at risk for any STI. Keep in mind that a person may be able to spread an STI even if they do not have symptoms. You and your partner(s) may want to get tested for STIs.  Where can you learn more?  Go to https://www.[facility].net/patiented  Enter B608 in the search box to learn more about \"Safer Sex: Care Instructions.\"  Current as of: April 19, 2023               Content Version: 13.8    5240-8862 [employer].   Care instructions adapted under license by your healthcare professional. If you have questions about a medical condition or this instruction, always ask your healthcare professional. [employer] disclaims any warranty or liability for your use of this information.      "

## 2024-02-21 NOTE — COMMUNITY RESOURCES LIST (ENGLISH)
02/21/2024    [employer]  N/A  For questions about this resource list or additional care needs, please contact your primary care clinic or care manager.  Phone: [phone]   Email: N/A   Address: [address] [place] [id number]   Hours: N/A        Exercise and Recreation       Gym or workout facility  1  [employer] [facility] [place] [facility]: 2.59 miles      In-Person   [address] [place] [address]  Language: English  Hours: Mon - Sun Open 24 Hours  Fees: Insurance, Self Pay, Sliding Fee   Phone: [phone] Email: [email or web address] Website: [email or web address]     2  [employer] [facility] [place]: 5.19 miles      In-Person   [address] [place] [address]  Language: English  Hours: Mon - Sun Open 24 Hours  Fees: Insurance, Self Pay, Sliding Fee   Phone: [phone] Website: [email or web address]          Important Numbers & Websites       Emergency Services   911  [facility] Services   311  Poison Control   (601) 120-7838  Suicide Prevention Lifeline   (409) 781-9347 (TALK)  Child Abuse Hotline   (639) 771-4734 (4-A-Child)  Sexual Assault Hotline   (495) 353-3629 (HOPE)  National Runaway Safeline   (213) 982-3735 (RUNAWAY)  All-Options Talkline   (352) 412-9718  Substance Abuse Referral   (928) 879-8704 (HELP)

## 2024-02-21 NOTE — LETTER
My Depression Action Plan  Name: Evita Cornelius   Date of Birth 1990  Date: 2/21/2024    My doctor: Alejandra Garay   My clinic: [facility] [place]  [address]  [place] [address]  [phone]            GREEN    ZONE   Good Control    What it looks like:   Things are going generally well. You have normal ups and downs. You may even feel depressed from time to time, but bad moods usually last less than a day.   What you need to do:  Continue to care for yourself (see self care plan)  Check your depression survival kit and update it as needed  Follow your physician s recommendations including any medication.  Do not stop taking medication unless you consult with your physician first.             YELLOW         ZONE Getting Worse    What it looks like:   Depression is starting to interfere with your life.   It may be hard to get out of bed; you may be starting to isolate yourself from others.  Symptoms of depression are starting to last most all day and this has happened for several days.   You may have suicidal thoughts but they are not constant.   What you need to do:     Call your care team. Your response to treatment will improve if you keep your care team informed of your progress. Yellow periods are signs an adjustment may need to be made.     Continue your self-care.  Just get dressed and ready for the day.  Don't give yourself time to talk yourself out of it.    Talk to someone in your support network.    Open up your Depression Self-Care Plan/Wellness Kit.             RED    ZONE Medical Alert - Get Help    What it looks like:   Depression is seriously interfering with your life.   You may experience these or other symptoms: You can t get out of bed most days, can t work or engage in other necessary activities, you have trouble taking care of basic hygiene, or basic responsibilities, thoughts of suicide or death that will not go away, self-injurious  behavior.     What you need to do:  Call your care team and request a same-day appointment. If they are not available (weekends or after hours) call your local crisis line, emergency room or 911.          Depression Self-Care Plan / Wellness Kit    Many people find that medication and therapy are helpful treatments for managing depression. In addition, making small changes to your everyday life can help to boost your mood and improve your wellbeing. Below are some tips for you to consider. Be sure to talk with your medical provider and/or behavioral health consultant if your symptoms are worsening or not improving.     Sleep   Sleep hygiene  means all of the habits that support good, restful sleep. It includes maintaining a consistent bedtime and wake time, using your bedroom only for sleeping or sex, and keeping the bedroom dark and free of distractions like a computer, smartphone, or television.     Develop a Healthy Routine  Maintain good hygiene. Get out of bed in the morning, make your bed, brush your teeth, take a shower, and get dressed. Don t spend too much time viewing media that makes you feel stressed. Find time to relax each day.    Exercise  Get some form of exercise every day. This will help reduce pain and release endorphins, the  feel good  chemicals in your brain. It can be as simple as just going for a walk or doing some gardening, anything that will get you moving.      Diet  Strive to eat healthy foods, including fruits and vegetables. Drink plenty of water. Avoid excessive sugar, caffeine, alcohol, and other mood-altering substances.     Stay Connected with Others  Stay in touch with friends and family members.    Manage Your Mood  Try deep breathing, massage therapy, biofeedback, or meditation. Take part in fun activities when you can. Try to find something to smile about each day.     Psychotherapy  Be open to working with a therapist if your provider recommends it.     Medication  Be sure to  take your medication as prescribed. Most anti-depressants need to be taken every day. It usually takes several weeks for medications to work. Not all medicines work for all people. It is important to follow-up with your provider to make sure you have a treatment plan that is working for you. Do not stop your medication abruptly without first discussing it with your provider.    Crisis Resources   These hotlines are for both adults and children. They and are open 24 hours a day, 7 days a week unless noted otherwise.    National Suicide Prevention Lifeline   988 or 6-941-347-VCXK (2772)    Crisis Text Line    www.crisistextline.org  Text HOME to 723032 from anywhere in the [place], anytime, about any type of crisis. A live, trained crisis counselor will receive the text and respond quickly.    [employer] Lifeline for LGBTQ Youth  A national crisis intervention and suicide lifeline for LGBTQ youth under 25. Provides a safe place to talk without judgement. Call 1-540.698.1480; text START to 047670 or visit www.thetrevorproject.org to talk to a trained counselor.    For Critical access hospital crisis numbers, visit the [facility] website at:  https://[place].gov/dhs/people-we-serve/adults/health-care/mental-health/resources/crisis-contacts.jsp

## 2024-02-23 LAB
BKR LAB AP GYN ADEQUACY: NORMAL
BKR LAB AP GYN INTERPRETATION: NORMAL
BKR LAB AP GYN OTHER FINDINGS: NORMAL
BKR LAB AP HPV REFLEX: NORMAL
BKR LAB AP PREVIOUS ABNORMAL: NORMAL
PATH REPORT.COMMENTS IMP SPEC: NORMAL
PATH REPORT.COMMENTS IMP SPEC: NORMAL
PATH REPORT.RELEVANT HX SPEC: NORMAL

## 2024-02-27 ENCOUNTER — TELEPHONE (OUTPATIENT)
Dept: FAMILY MEDICINE | Facility: CLINIC | Age: 34
End: 2024-02-27
Payer: COMMERCIAL

## 2024-02-27 DIAGNOSIS — E03.8 OTHER SPECIFIED HYPOTHYROIDISM: Primary | ICD-10-CM

## 2024-02-27 DIAGNOSIS — A59.01 TRICHOMONAS VAGINITIS: Primary | ICD-10-CM

## 2024-02-27 LAB
HUMAN PAPILLOMA VIRUS 16 DNA: NEGATIVE
HUMAN PAPILLOMA VIRUS 18 DNA: NEGATIVE
HUMAN PAPILLOMA VIRUS FINAL DIAGNOSIS: NORMAL
HUMAN PAPILLOMA VIRUS OTHER HR: NEGATIVE

## 2024-02-27 RX ORDER — LEVOTHYROXINE SODIUM 25 UG/1
25 TABLET ORAL DAILY
Qty: 90 TABLET | Refills: 3 | Status: SHIPPED | OUTPATIENT
Start: 2024-02-27 | End: 2024-06-06

## 2024-02-27 RX ORDER — METRONIDAZOLE 500 MG/1
500 TABLET ORAL 2 TIMES DAILY
Qty: 14 TABLET | Refills: 0 | Status: SHIPPED | OUTPATIENT
Start: 2024-02-27 | End: 2024-03-05

## 2024-02-27 NOTE — TELEPHONE ENCOUNTER
Kindly let her know that I have added 25 mcg to her dose. She is to take it together with the 200 mcg dose. I have sent it to the pharmacy.  Will repeat lab in 6 to 8 weeks. Kindly  advised her to schedule a lab only appointment for it.

## 2024-02-27 NOTE — TELEPHONE ENCOUNTER
Patient was updated. She stated she does take her thyroid medication daily and on an empty stomach. She will wait for increased dose to [facility].     Ivy Jordan RN on 2/27/2024 at 1:39 PM

## 2024-02-27 NOTE — TELEPHONE ENCOUNTER
I called and spoke to patient, advised her of the additional thyroid med to take along with her 200 mcg.    Scheduled lab test as advised.    Patient verbalized understanding of and agreement with plan.    Moon VAZQUEZ RN  [facility] Triage     2 yo M presents with complaint of barking cough. Patient was ill with fever and URI sx last week with resolution 4-5 days ago. However, woke up around 12:30 AM today with a barking cough with difficulty breathing. Denies tachypnea, retractions or increased work of breathing. Postussive emesis x 1, NBNB with clear phlegm. Denies fever, chills, rash, diarrhea, sick contacts, recent travel. Parents report inspiratory stridor at rest on the car ride. Reports improvement in symptoms with exposure to cold weather. Good po and 6 wet diapers.   PMH: none   PSH: none   Meds: none   ALL: none   IUTD

## 2024-02-27 NOTE — TELEPHONE ENCOUNTER
See note from provider in another encounter, copied below for ease of communications (I can't figure out where to document in the other encounter):          Result Follow Up  Open  2/27/2024  [facility] Alejandra Godinez MD  Family Medicine     Progress Notes  Alejandra Garay MD (Physician)  Family Medicine  Unsigned  Please call her with results. She did not read the result message that I sent.  Evita,     Your low thyroid is not well-controlled. Do you remember to take your medication daily and on an empty stomach an hour before breakfast? If yes ,we will need to increase your medication.     There is an incidental finding of trichomonas vaginalis in your pap smear. I sent flagyl to the pharmacy for treatment.      Your kidney function, liver function, electrolyte is normal.  You do not have diabetes.  You are immune to hepatitis B.  You are negative for hepatitis C.  You do not have anemia. Your cholesterol is stable. Continue to eat healthy  Please contact me if you have additional questions or concerns             Attempted to call patient at home/mobile number, no answer, left message on voicemail; patient was instructed to return call to [facility] at [phone].    Moon VAZQUEZ RN  [facility] Triage

## 2024-02-27 NOTE — PROGRESS NOTES
Please call her with results. She did not read the result message that I sent.  Evita,     You low thyroid is not well-controlled. Do you remember to take your medication daily and on an empty stomach an hour before breakfast? If yes ,we will need to increase your medication.     There is an incidental finding of trichomonas vaginalis in your pap smear. I sent flagyl to the pharmacy for treatment.     Your kidney function, liver function, electrolyte is normal.  You do not have diabetes.  You are immune to hepatitis B.  You are negative for hepatitis C.  You do not have anemia. Your cholesterol is stable. Continue to eat healthy  Please contact me if you have additional questions or concerns     Alejandra Garay MD,MPH

## 2024-03-27 ENCOUNTER — OFFICE VISIT (OUTPATIENT)
Dept: FAMILY MEDICINE | Facility: CLINIC | Age: 34
End: 2024-03-27
Attending: STUDENT IN AN ORGANIZED HEALTH CARE EDUCATION/TRAINING PROGRAM
Payer: COMMERCIAL

## 2024-03-27 VITALS
WEIGHT: 208 LBS | RESPIRATION RATE: 20 BRPM | HEIGHT: 66 IN | DIASTOLIC BLOOD PRESSURE: 68 MMHG | OXYGEN SATURATION: 98 % | BODY MASS INDEX: 33.43 KG/M2 | TEMPERATURE: 99.2 F | HEART RATE: 89 BPM | SYSTOLIC BLOOD PRESSURE: 104 MMHG

## 2024-03-27 DIAGNOSIS — E03.8 OTHER SPECIFIED HYPOTHYROIDISM: ICD-10-CM

## 2024-03-27 DIAGNOSIS — F31.62 BIPOLAR DISORDER, CURRENT EPISODE MIXED, MODERATE (H): ICD-10-CM

## 2024-03-27 DIAGNOSIS — M54.50 ACUTE RIGHT-SIDED LOW BACK PAIN WITHOUT SCIATICA: Primary | ICD-10-CM

## 2024-03-27 PROCEDURE — 99214 OFFICE O/P EST MOD 30 MIN: CPT | Performed by: STUDENT IN AN ORGANIZED HEALTH CARE EDUCATION/TRAINING PROGRAM

## 2024-03-27 ASSESSMENT — PATIENT HEALTH QUESTIONNAIRE - PHQ9
10. IF YOU CHECKED OFF ANY PROBLEMS, HOW DIFFICULT HAVE THESE PROBLEMS MADE IT FOR YOU TO DO YOUR WORK, TAKE CARE OF THINGS AT HOME, OR GET ALONG WITH OTHER PEOPLE: NOT DIFFICULT AT ALL
SUM OF ALL RESPONSES TO PHQ QUESTIONS 1-9: 7
SUM OF ALL RESPONSES TO PHQ QUESTIONS 1-9: 7

## 2024-03-27 ASSESSMENT — ANXIETY QUESTIONNAIRES
7. FEELING AFRAID AS IF SOMETHING AWFUL MIGHT HAPPEN: NOT AT ALL
4. TROUBLE RELAXING: SEVERAL DAYS
GAD7 TOTAL SCORE: 3
GAD7 TOTAL SCORE: 3
5. BEING SO RESTLESS THAT IT IS HARD TO SIT STILL: NOT AT ALL
8. IF YOU CHECKED OFF ANY PROBLEMS, HOW DIFFICULT HAVE THESE MADE IT FOR YOU TO DO YOUR WORK, TAKE CARE OF THINGS AT HOME, OR GET ALONG WITH OTHER PEOPLE?: NOT DIFFICULT AT ALL
7. FEELING AFRAID AS IF SOMETHING AWFUL MIGHT HAPPEN: NOT AT ALL
1. FEELING NERVOUS, ANXIOUS, OR ON EDGE: SEVERAL DAYS
IF YOU CHECKED OFF ANY PROBLEMS ON THIS QUESTIONNAIRE, HOW DIFFICULT HAVE THESE PROBLEMS MADE IT FOR YOU TO DO YOUR WORK, TAKE CARE OF THINGS AT HOME, OR GET ALONG WITH OTHER PEOPLE: NOT DIFFICULT AT ALL
GAD7 TOTAL SCORE: 3
2. NOT BEING ABLE TO STOP OR CONTROL WORRYING: NOT AT ALL
3. WORRYING TOO MUCH ABOUT DIFFERENT THINGS: NOT AT ALL
6. BECOMING EASILY ANNOYED OR IRRITABLE: SEVERAL DAYS

## 2024-03-27 NOTE — PROGRESS NOTES
Assessment & Plan     Acute right-sided low back pain without sciatica  Unstable continue RICE and Tylenol as needed  - Physical Therapy  Referral; Future    Bipolar disorder, current episode mixed, moderate (H)  Doing well since Seroquel was added to her regimen  - PRIMARY CARE FOLLOW-UP SCHEDULING    Other specified hypothyroidism  Currently on 225 mcg of Synthroid.  Recheck lab in 2 weeks which she is going to be 6 weeks from when her medication was increased                Shin Jama is a 34 year old, presenting for the following health issues:  Back Pain Fup      3/27/2024     1:54 PM   Additional Questions   Roomed by Kala PREEIRA         3/27/2024     1:54 PM   Patient Reported Additional Medications   Patient reports taking the following new medications No new medications to add     History of Present Illness       Back Pain:  She presents for follow up of back pain.   Location of back pain:  Right middle of back and left buttock  Description of back pain: dull ache, sharp and shooting  Back pain spreads: left buttocks    Since patient first noticed back pain, pain is: always present, but gets better and worse  Does back pain interfere with her job:  Yes       Mental Health Follow-up:  Patient presents to follow-up on Anxiety.    Patient's anxiety since last visit has been:  Better  The patient is not having other symptoms associated with anxiety.  Any significant life events: No  Patient is not feeling anxious or having panic attacks.  Patient has no concerns about alcohol or drug use.      Seroquel was started at last visit. She is tolerating medication well.    Her back pain improved with prednisone. But started back again when she was done with the prednisone. Her synthroid was increased after her TSH came back elevated.        Review of Systems  Constitutional, HEENT, cardiovascular, pulmonary, gi and gu systems are negative, except as otherwise noted.      Objective    /68 (BP  "Location: Left arm, Patient Position: Chair, Cuff Size: Adult Large)   Pulse 89   Temp 99.2  F (37.3  C) (Tympanic)   Resp 20   Ht 1.676 m (5' 6\")   Wt 94.3 kg (208 lb)   LMP 02/19/2024   SpO2 98%   BMI 33.57 kg/m    Body mass index is 33.57 kg/m .  Physical Exam   GENERAL: alert and no distress  NECK: RESP: lungs clear to auscultation - no rales, rhonchi or wheezes  CV: regular rate and rhythm, normal S1 S2, no S3 or S4,   MS: no gross musculoskeletal defects noted, no edema        Signed Electronically by: Alejandra Garay MD    "

## 2024-03-27 NOTE — PATIENT INSTRUCTIONS
Fercho Jama,    Thank you for allowing [facility] to manage your care.      I made a referral, they will be calling in approximately 1 week to set up your appointment.  If you do not hear from them, please call the specialty number on your after visit.     For your convenience, test results are released as soon as they are available  Please allow 1-2 business days for me to send you a comment about your results.  If not done so, I encourage you to login into [employer] ([email or web address] to review your results in real time.     If you have any questions or concerns, please feel free to call us at [phone].    Sincerely,    Dr. Garay    Did you know?      You can schedule a video visit for follow-up appointments as well as future appointments for certain conditions.  Please see the below link.     [email or web address]    If you have not already done so,  I encourage you to sign up for [employer] ([email or web address]).  This will allow you to review your results, securely communicate with a provider, and schedule virtual visits as well.
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
## 2024-04-08 ENCOUNTER — THERAPY VISIT (OUTPATIENT)
Dept: PHYSICAL THERAPY | Facility: CLINIC | Age: 34
End: 2024-04-08
Attending: STUDENT IN AN ORGANIZED HEALTH CARE EDUCATION/TRAINING PROGRAM
Payer: COMMERCIAL

## 2024-04-08 DIAGNOSIS — M54.50 ACUTE RIGHT-SIDED LOW BACK PAIN WITHOUT SCIATICA: ICD-10-CM

## 2024-04-08 PROCEDURE — 97161 PT EVAL LOW COMPLEX 20 MIN: CPT | Mod: GP

## 2024-04-08 PROCEDURE — 97110 THERAPEUTIC EXERCISES: CPT | Mod: GP

## 2024-04-10 ENCOUNTER — LAB (OUTPATIENT)
Dept: LAB | Facility: CLINIC | Age: 34
End: 2024-04-10
Payer: COMMERCIAL

## 2024-04-10 DIAGNOSIS — E03.8 OTHER SPECIFIED HYPOTHYROIDISM: ICD-10-CM

## 2024-04-10 PROCEDURE — 84439 ASSAY OF FREE THYROXINE: CPT

## 2024-04-10 PROCEDURE — 84443 ASSAY THYROID STIM HORMONE: CPT

## 2024-04-10 PROCEDURE — 36415 COLL VENOUS BLD VENIPUNCTURE: CPT

## 2024-04-11 ENCOUNTER — MYC MEDICAL ADVICE (OUTPATIENT)
Dept: FAMILY MEDICINE | Facility: CLINIC | Age: 34
End: 2024-04-11
Payer: COMMERCIAL

## 2024-04-11 LAB
T4 FREE SERPL-MCNC: 1.33 NG/DL (ref 0.9–1.7)
TSH SERPL DL<=0.005 MIU/L-ACNC: 0.08 UIU/ML (ref 0.3–4.2)

## 2024-04-12 NOTE — TELEPHONE ENCOUNTER
Would you like a virtual visit to discuss weight loss options?     Ivy Jordan RN on 4/12/2024 at 8:54 AM
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
## 2024-04-21 SDOH — HEALTH STABILITY: PHYSICAL HEALTH: ON AVERAGE, HOW MANY MINUTES DO YOU ENGAGE IN EXERCISE AT THIS LEVEL?: 30 MIN

## 2024-04-21 SDOH — HEALTH STABILITY: PHYSICAL HEALTH: ON AVERAGE, HOW MANY DAYS PER WEEK DO YOU ENGAGE IN MODERATE TO STRENUOUS EXERCISE (LIKE A BRISK WALK)?: 5 DAYS

## 2024-04-21 ASSESSMENT — SOCIAL DETERMINANTS OF HEALTH (SDOH): HOW OFTEN DO YOU GET TOGETHER WITH FRIENDS OR RELATIVES?: MORE THAN THREE TIMES A WEEK

## 2024-04-23 ENCOUNTER — THERAPY VISIT (OUTPATIENT)
Dept: PHYSICAL THERAPY | Facility: CLINIC | Age: 34
End: 2024-04-23
Payer: COMMERCIAL

## 2024-04-23 DIAGNOSIS — F31.62 BIPOLAR DISORDER, CURRENT EPISODE MIXED, MODERATE (H): ICD-10-CM

## 2024-04-23 DIAGNOSIS — F33.1 MODERATE RECURRENT MAJOR DEPRESSION (H): ICD-10-CM

## 2024-04-23 DIAGNOSIS — F51.01 PRIMARY INSOMNIA: ICD-10-CM

## 2024-04-23 DIAGNOSIS — F41.1 GENERALIZED ANXIETY DISORDER: ICD-10-CM

## 2024-04-23 DIAGNOSIS — M54.50 ACUTE RIGHT-SIDED LOW BACK PAIN WITHOUT SCIATICA: Primary | ICD-10-CM

## 2024-04-23 PROCEDURE — 97110 THERAPEUTIC EXERCISES: CPT | Mod: GP

## 2024-04-23 PROCEDURE — 97140 MANUAL THERAPY 1/> REGIONS: CPT | Mod: GP

## 2024-04-24 ENCOUNTER — OFFICE VISIT (OUTPATIENT)
Dept: FAMILY MEDICINE | Facility: CLINIC | Age: 34
End: 2024-04-24
Payer: COMMERCIAL

## 2024-04-24 VITALS
HEIGHT: 66 IN | HEART RATE: 89 BPM | RESPIRATION RATE: 12 BRPM | WEIGHT: 214.6 LBS | DIASTOLIC BLOOD PRESSURE: 70 MMHG | SYSTOLIC BLOOD PRESSURE: 112 MMHG | OXYGEN SATURATION: 98 % | BODY MASS INDEX: 34.49 KG/M2 | TEMPERATURE: 98.1 F

## 2024-04-24 DIAGNOSIS — L91.8 SKIN TAG: Primary | ICD-10-CM

## 2024-04-24 PROCEDURE — 11200 RMVL SKIN TAGS UP TO&INC 15: CPT | Performed by: PHYSICIAN ASSISTANT

## 2024-04-24 RX ORDER — QUETIAPINE FUMARATE 25 MG/1
25 TABLET, FILM COATED ORAL AT BEDTIME
Qty: 30 TABLET | Refills: 5 | Status: SHIPPED | OUTPATIENT
Start: 2024-04-24 | End: 2024-06-27

## 2024-04-24 ASSESSMENT — PAIN SCALES - GENERAL: PAINLEVEL: NO PAIN (0)

## 2024-04-24 NOTE — PROGRESS NOTES
"  Assessment & Plan     Skin tag  S:    O: Patient appears well. 1 benign skin tag are noted below left breast with large base (approximately 3 mm)     A: Skin tag    P: Skin tag was cleansed with betadine, anesthestized with 1% lido with epi and removed with a pickup and #10 blade.  BleedingThese pathognomonic lesions are not sent for pathology.    - REMOVAL OF SKIN TAGS, FIRST 15              Counseling  Appropriate preventive services were discussed with this patient, including applicable screening as appropriate for fall prevention, nutrition, physical activity, Tobacco-use cessation, weight loss and cognition.  Checklist reviewing preventive services available has been given to the patient.  Reviewed patient's diet, addressing concerns and/or questions.           Shin Jama is a 34 year old, presenting for the following health issues:  Skin Tags (removal)        4/24/2024     3:15 PM   Additional Questions   Roomed by Enma LOZA CMA   Accompanied by alone         4/24/2024     3:15 PM   Patient Reported Additional Medications   Patient reports taking the following new medications none     HPI       Patient would like skin tag removal. The patient complains of symptomatic skin tag on the abdomen and pelvic area. These are irritated by clothing, jewelry and rubbing.  Skin tag on the pubic area was recently knicked/shaved off while shaving and is scabbed over.         Review of Systems  Constitutional, HEENT, cardiovascular, pulmonary, gi and gu systems are negative, except as otherwise noted.      Objective    /70   Pulse 89   Temp 98.1  F (36.7  C) (Temporal)   Resp 12   Ht 1.676 m (5' 6\")   Wt 97.3 kg (214 lb 9.6 oz)   LMP 04/05/2024 (Within Days)   SpO2 98%   BMI 34.64 kg/m    Body mass index is 34.64 kg/m .  Physical Exam   GENERAL: alert and no distress            Signed Electronically by: Connie Silva PA-C    "

## 2024-05-01 ENCOUNTER — MYC REFILL (OUTPATIENT)
Dept: FAMILY MEDICINE | Facility: CLINIC | Age: 34
End: 2024-05-01
Payer: COMMERCIAL

## 2024-05-01 DIAGNOSIS — F51.01 PRIMARY INSOMNIA: ICD-10-CM

## 2024-05-01 DIAGNOSIS — F31.62 BIPOLAR DISORDER, CURRENT EPISODE MIXED, MODERATE (H): ICD-10-CM

## 2024-05-01 DIAGNOSIS — F33.1 MODERATE RECURRENT MAJOR DEPRESSION (H): ICD-10-CM

## 2024-05-01 DIAGNOSIS — F41.1 GENERALIZED ANXIETY DISORDER: ICD-10-CM

## 2024-05-01 RX ORDER — QUETIAPINE FUMARATE 25 MG/1
25 TABLET, FILM COATED ORAL AT BEDTIME
Qty: 30 TABLET | Refills: 5 | OUTPATIENT
Start: 2024-05-01
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
## 2024-05-07 ENCOUNTER — THERAPY VISIT (OUTPATIENT)
Dept: PHYSICAL THERAPY | Facility: CLINIC | Age: 34
End: 2024-05-07
Payer: COMMERCIAL

## 2024-05-07 DIAGNOSIS — M54.50 ACUTE RIGHT-SIDED LOW BACK PAIN WITHOUT SCIATICA: Primary | ICD-10-CM

## 2024-05-07 PROCEDURE — 97110 THERAPEUTIC EXERCISES: CPT | Mod: GP

## 2024-05-20 ENCOUNTER — VIRTUAL VISIT (OUTPATIENT)
Dept: FAMILY MEDICINE | Facility: CLINIC | Age: 34
End: 2024-05-20
Payer: COMMERCIAL

## 2024-05-20 DIAGNOSIS — E66.9 OBESITY (BMI 30-39.9): ICD-10-CM

## 2024-05-20 DIAGNOSIS — F31.62 BIPOLAR DISORDER, CURRENT EPISODE MIXED, MODERATE (H): Primary | ICD-10-CM

## 2024-05-20 DIAGNOSIS — R63.5 WEIGHT GAIN: ICD-10-CM

## 2024-05-20 DIAGNOSIS — F19.11 HISTORY OF SUBSTANCE ABUSE (H): ICD-10-CM

## 2024-05-20 DIAGNOSIS — F51.01 PRIMARY INSOMNIA: ICD-10-CM

## 2024-05-20 DIAGNOSIS — F33.1 MODERATE RECURRENT MAJOR DEPRESSION (H): ICD-10-CM

## 2024-05-20 DIAGNOSIS — E03.9 HYPOTHYROIDISM, UNSPECIFIED TYPE: ICD-10-CM

## 2024-05-20 DIAGNOSIS — Z30.09 ENCOUNTER FOR COUNSELING REGARDING INITIATION OF OTHER CONTRACEPTIVE MEASURE: ICD-10-CM

## 2024-05-20 PROCEDURE — 99215 OFFICE O/P EST HI 40 MIN: CPT | Mod: 95 | Performed by: PHYSICIAN ASSISTANT

## 2024-05-20 PROCEDURE — G2211 COMPLEX E/M VISIT ADD ON: HCPCS | Mod: 95 | Performed by: PHYSICIAN ASSISTANT

## 2024-05-20 RX ORDER — PAROXETINE 10 MG/1
TABLET, FILM COATED ORAL
Qty: 42 TABLET | Refills: 0 | Status: SHIPPED | OUTPATIENT
Start: 2024-05-20 | End: 2024-06-05

## 2024-05-20 NOTE — PROGRESS NOTES
Evita is a 34 year old who is being evaluated via a billable video visit.    How would you like to obtain your AVS? MyChart  If the video visit is dropped, the invitation should be resent by: Text to cell phone: [phone]  Will anyone else be joining your video visit? No      Assessment & Plan     Bipolar disorder, current episode mixed, moderate (H)  This was diagnosed when she was on various substances, she reports no manic episodes (just depression).  I suggest further clarification with psychiatrist to confirm diagnosis and adjust medication.     Weight gain has started since she was put on the seroquel, however I would hesitate to take her off the mood stabilizer if she does have bipolar.      Will instead taper off paxil (which can also cause weight gain).         - PARoxetine (PAXIL) 10 MG tablet; Take 2 tablets (20 mg) by mouth every morning for 14 days, THEN 1 tablet (10 mg) every morning for 14 days.  - Adult Mental Health [facility] Referral; Future    Primary insomnia  F/up with psychiatrist.  Currently on seroquel to help with sleep however this is causing significant weight gain.  Has not tolerated other medication in the past (such as hydroxyzine) and needed higher and higher dose of trazodone.     History of substance abuse (H)  Congratulated on continued sobriety.     Encounter for counseling regarding initiation of other contraceptive measure  Contraceptive Counseling.  I discussed the many different options for birth control, including the associated risks and benefits.     Combination estrogen/progesterone options (which typically have the most regular and predictable cycle):  Will avoid estrogen due to weight concern.   Other options:   Nexplanon:  Implantable progesterone device.  Good for 3 years.  More irregular bleeding with this.     IUD (Mirena):  Progesterone IUD.  Good for 5 years.  Often less bleeding, very light or it may stop all together.  Insertion can be uncomfortable.  We  should do a virtual visit to discuss the procedure in more detail if you would like to do this one.      Depo shot.  Will avoid due to concern about weight gain.       Patient has opted to begin treatment with Mirena IUD.  Appt for IUD placement scheduled.  I recommend she have protected intercourse until appt.       Hypothyroidism, unspecified type  Thyroid levels have been fluctuating.  Last TSH showed her to be in a hyperthyroid state (it is not advisable to look at T4 when managing hypothyroidism with levo).  She is currently on 225 mcg, I suggest rechecking thyroid levels in 1 month.  She denies any symptoms of hyperthyroidism currently.   - TSH; Future  - Semaglutide-Weight Management (WEGOVY) 0.25 MG/0.5ML pen; Inject 0.25 mg Subcutaneous once a week    Moderate recurrent major depression (H)  Will taper off paxil to start, mental health is currently stable now that she is sober.  If she does have bipolar, I would prefer to get her off the selective serotonin reuptake inhibitor over a mood stabilizer . . . However I highly suspect the seroquel is contributing to weight gain as she first noticed the weight when seroquel was added.  She was tolerating trazodone in the past.  She did see a psychiatrist at [facility] in 2016 (court ordered), unclear what diagnoses were at that time.  An ER visit in 2021 is the first time bipolar is noted in chart from what I can see.        - PARoxetine (PAXIL) 10 MG tablet; Take 2 tablets (20 mg) by mouth every morning for 14 days, THEN 1 tablet (10 mg) every morning for 14 days.    Weight gain  Likely multifactorial.    Medications a likely a big contributor here, I suggest first tapering off paxil.    Thyroid has been fluctuating quite a bit, will recheck levels in 1 month.   Discussed healthy diet and exercise.  Recommend she limit sugars/carbs in diet.     Discussed GLP1 agonist class medications with patient   -Serious reactions including pancreatitis, anaphylaxis, papillary  thyroid cancer, thyroid cell and medullary thyroid carcinoma risk discussed. Patient does not have any personal or family history of thyroid malignancies.       - PARoxetine (PAXIL) 10 MG tablet; Take 2 tablets (20 mg) by mouth every morning for 14 days, THEN 1 tablet (10 mg) every morning for 14 days.  - Semaglutide-Weight Management (WEGOVY) 0.25 MG/0.5ML pen; Inject 0.25 mg Subcutaneous once a week          45 minutes spent by me on the date of the encounter doing chart review, history and exam, documentation and further activities per the note    The longitudinal plan of care for the diagnosis(es)/condition(s) as documented were addressed during this visit. Due to the added complexity in care, I will continue to support Evita in the subsequent management and with ongoing continuity of care.      Shin Jama is a 34 year old, presenting for the following health issues:  No chief complaint on file.        5/20/2024     9:42 AM   Additional Questions   Roomed by Jeanne   Accompanied by Pt completed questions online       Video Start Time: 11:34 AM    Patient with history of Hypothyroidism arrived to discuss concerns with Weight Gain and Weight Loss options.    Last Clinic recorded weight on 04/24/24; 214lbs  30 lbs in the past 4 months.      She c/o weight gain over the past 4 months. She has increased dose of thyroid medication (225 mcg).    She does work outside and is constantly moving around.  Gym a few times per week.  Doesn't eat after 8 pm.   She has noticed more weight loss.      She started seroquel 4 months ago and has been on paxil for 2 years.  She does not see a psychiatrist.   She was started on seroquel to help her sleep at night.  She has a h/o bipolar (typically more depression symptoms).  No manic episodes.    She was previously on trazodone but needed to increase dose so it was switched to seroquel.   Has used hydroxyzine in the past and this did not work.      Was started on paxil after  she was coming off drugs.  She has now been sober long enough and doesn't feel she needs the paxil.      Has irregular periods and interested in contraception.  She is interested in nexplanon or IUD.       History of Present Illness       Reason for visit:  Weight gain    She eats 2-3 servings of fruits and vegetables daily.She consumes 3 sweetened beverage(s) daily.She exercises with enough effort to increase her heart rate 30 to 60 minutes per day.  She exercises with enough effort to increase her heart rate 4 days per week.   She is taking medications regularly.               Review of Systems  Constitutional, neuro, ENT, endocrine, pulmonary, cardiac, gastrointestinal, genitourinary, musculoskeletal, integument and psychiatric systems are negative, except as otherwise noted.      Objective           Vitals:  No vitals were obtained today due to virtual visit.    Physical Exam   GENERAL: alert and no distress  EYES: Eyes grossly normal to inspection.  No discharge or erythema, or obvious scleral/conjunctival abnormalities.  RESP: No audible wheeze, cough, or visible cyanosis.    SKIN: Visible skin clear. No significant rash, abnormal pigmentation or lesions.  NEURO: Cranial nerves grossly intact.  Mentation and speech appropriate for age.  PSYCH: Appropriate affect, tone, and pace of words          Video-Visit Details    Type of service:  Video Visit   Video End Time:12:07 PM  Originating Location (pt. Location): Home    Distant Location (provider location):  On-site  Platform used for Video Visit: Sofy  Signed Electronically by: Connie Silva PA-C

## 2024-05-20 NOTE — PATIENT INSTRUCTIONS
I suggest you take 600 mg ibuprofen before your IUD placement.   Will need a urine sample before your arrive.     Weight gain:   This may be secondary to medications (both paxil and seroquel can cause weight gain).  Let's try tapering you off the paxil by taking 20 mg daily x 14 days then 10 mg daily x 14 days.     I do suggest f/up with a psychiatrist (to consult on your medications and clarify/confirm diagnosis).

## 2024-05-29 ENCOUNTER — TELEPHONE (OUTPATIENT)
Dept: FAMILY MEDICINE | Facility: CLINIC | Age: 34
End: 2024-05-29

## 2024-05-29 ENCOUNTER — OFFICE VISIT (OUTPATIENT)
Dept: PODIATRY | Facility: CLINIC | Age: 34
End: 2024-05-29
Payer: COMMERCIAL

## 2024-05-29 VITALS — BODY MASS INDEX: 34.54 KG/M2 | WEIGHT: 214 LBS | DIASTOLIC BLOOD PRESSURE: 78 MMHG | SYSTOLIC BLOOD PRESSURE: 118 MMHG

## 2024-05-29 DIAGNOSIS — M79.672 LEFT FOOT PAIN: Primary | ICD-10-CM

## 2024-05-29 DIAGNOSIS — L60.0 INGROWN NAIL OF GREAT TOE OF LEFT FOOT: ICD-10-CM

## 2024-05-29 PROCEDURE — 99203 OFFICE O/P NEW LOW 30 MIN: CPT | Mod: 25 | Performed by: PODIATRIST

## 2024-05-29 PROCEDURE — 11730 AVULSION NAIL PLATE SIMPLE 1: CPT | Mod: TA | Performed by: PODIATRIST

## 2024-05-29 NOTE — TELEPHONE ENCOUNTER
Reason for Call:  Other prescription    Detailed comments: PATIENT IS CALLING ABOUT A PRIOR AUTH FOR MEDICATION      Semaglutide-Weight Management (WEGOVY) 0.25 MG/0.5ML pen [[id number]]       Phone Number Patient can be reached at: Cell number on file:    Telephone Information:   Mobile [phone]       Best Time: ASAP    Can we leave a detailed message on this number? YES    Call taken on 5/29/2024 at 2:17 PM by Lisa Silver

## 2024-05-29 NOTE — TELEPHONE ENCOUNTER
Central Prior Authorization Team  Phone: [phone]    PA Initiation    Medication: WEGOVY 0.25 MG/0.5ML SC SOAJ  Insurance Company: [employer] - Phone [phone] Fax [phone]  Pharmacy Filling the Rx: [id number] - [place], [place] - [address]  Filling Pharmacy Phone: [phone]  Filling Pharmacy Fax:    Start Date: 5/29/2024

## 2024-05-29 NOTE — TELEPHONE ENCOUNTER
Prior Authorization Retail Medication Request    Medication/Dose: Semaglutide-Weight Management (WEGOVY) 0.25 MG/0.5ML pen    Diagnosis and ICD code (if different than what is on RX):    Hypothyroidism, unspecified type [E03.9]      Weight gain [R63.5]      Obesity (BMI 30-39.9) [E66.9]        New/renewal/insurance change PA/secondary ins. PA:  Previously Tried and Failed:    Rationale:      Insurance   Primary:   [employer] [place]   Insurance ID:  [id number]     Secondary (if applicable):  Insurance ID:      Pharmacy Information (if different than what is on RX)  Name:    Phone:    Fax:
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
## 2024-05-29 NOTE — PATIENT INSTRUCTIONS
Thank you for choosing [facility] Podiatry / Foot & Ankle Surgery!    DR DIANA'S CLINIC:  [facility]   [address]   [place] [address]      TRIAGE LINE: [phone]  APPOINTMENTS: [phone]  RADIOLOGY: [phone]  SET UP SURGERY: [phone]  PHYSICAL THERAPY: [phone]   FAX NUMBER: [phone]  BILLING QUESTIONS: [phone]       Follow up: As needed      INGROWN TOENAILS  When a toenail is ingrown, it is curved and grows into the skin, usually at the nail borders (the sides of the nail). This  digging in  of the nail irritates the skin, often creating pain, redness, swelling, and warmth in the toe.  If an ingrown nail causes a break in the skin, bacteria may enter and cause an infection in the area, which is often marked by drainage and a foul odor. However, even if the toe isn t painful, red, swollen, or warm, a nail that curves downward into the skin can progress to an infection.  CAUSES:  Heredity: In many people, the tendency for ingrown toenails is inherited.   Trauma: Sometimes an ingrown toenail is the result of trauma, such as stubbing your toe, having an object fall on your toe, or engaging in activities that involve repeated pressure on the toes, such as kicking or running.   Improper Trimming:  The most common cause of ingrown toenails is cutting your nails too short. This encourages the skin next to the nail to fold over the nail.   Improperly Sized Footwear: Ingrown toenails can result from wearing socks and shoes that are tight or short.   Nail Conditions: Ingrown toenails can be caused by nail problems, such as fungal infections or losing a nail due to trauma.   TREATMENT: Sometimes initial treatment for ingrown toenails can be safely performed at home. However, home treatment is strongly discouraged if an infection is suspected, or for those who have medical conditions that put feet at high risk, such as diabetes, nerve damage in the foot, or poor  circulation.  Home care: If you don t have an infection or any of the above medical conditions, you can soak your foot in room-temperature water (adding Epsom s salt may be recommended by your doctor), and gently massage the side of the nail fold to help reduce the inflammation.  Avoid attempting  bathroom surgery.  Repeated cutting of the nail can cause the condition to worsen over time. If your symptoms fail to improve, it s time to see a foot and ankle surgeon.  Physician care: After examining the toe, the foot and ankle surgeon will select the treatment best suited for you. If an infection is present, an oral antibiotic may be prescribed.  Sometimes a minor surgical procedure, often performed in the office, will ease the pain and remove the offending nail. After applying a local anesthetic, the doctor removes part of the nail s side border. Some nails may become ingrown again, requiring removal of the nail root.  Following the nail procedure, a light bandage will be applied. Most people experience very little pain after surgery and may resume normal activity the next day. If your surgeon has prescribed an oral antibiotic, be sure to take all the medication, even if your symptoms have improved.  PREVENTION:  Proper Trimming: Cut toenails in a fairly straight line, and don t cut them too short. You should be able to get your fingernail under the sides and end of the nail.   Well-fitting Footwear: Don t wear shoes that are short or tight in the toe area. Avoid shoes that are loose, because they too cause pressure on the toes, especially when running or walking briskly.     INGROWN TOENAIL REMOVAL AFTERCARE   Go directly home and elevate the affected foot on one or two pillows for the remainder of the day/evening if possible. Your toe may stay numb anywhere from 2-8 hours.   Take Tylenol, ibuprofen or another anti-inflammatory as needed for pain.   Take antibiotic if that has been prescribed. Finish the entire  prescribed antibiotic even if your symptoms have improved.   The evening of the procedure, soak/wash the affected area in warm water (you may add Epsom salt) for 5 to 10 minutes. Do this twice a day for 2-4 weeks (6-8 weeks if you had phenol) (you may count showering/bathing as one soak).  After soaks, pat the area dry and then allow to airdry for a few minutes. Apply antibiotic ointment to the area and cover with 2 X 2 gauze and paper tape or band-aid.  You may pursue everyday activities as tolerated with either an open toe shoe or cut-out shoe as needed or you may wear regular shoes if no pain is noted.  Watch for any signs and symptoms of infection such as: redness, red streaks going up the foot/leg, swelling, pus or foul odor. Those that have had the phenol procedure, the toe will drain longer and will look like it is infected because it is a chemical burn.   Please call with questions.

## 2024-05-29 NOTE — LETTER
May 29, 2024      Evita Cornelius  [address] [address]        To Whom It May Concern:    Evita Cornelius  was seen on 5/29/2024.  Please excuse her from work today due to a procedure she had done on her left foot.      Sincerely,            Shannon Holt DPM, Podiatry/Foot and Ankle Surgery

## 2024-05-30 NOTE — TELEPHONE ENCOUNTER
Central Prior Authorization Team  Phone: [phone]    Prior Authorization Approval    Medication: WEGOVY 0.25 MG/0.5ML SC SOAJ  Authorization Effective Date: 2/29/2024  Authorization Expiration Date: 5/29/2025  Approved Dose/Quantity:   Reference #:     Insurance Company: [employer] - Phone [phone] Fax [phone]  Expected CoPay: $    CoPay Card Available:      Financial Assistance Needed:   Which Pharmacy is filling the prescription: [id number] - [place], [place] - [address] AT [address] OF [address]  Pharmacy Notified: yes  Patient Notified: PHARMACY WILL NOTIFY PT WHEN READY

## 2024-05-31 ENCOUNTER — TELEPHONE (OUTPATIENT)
Dept: FAMILY MEDICINE | Facility: CLINIC | Age: 34
End: 2024-05-31
Payer: COMMERCIAL

## 2024-05-31 NOTE — TELEPHONE ENCOUNTER
Prior Authorization Retail Medication Request    Medication/Dose: PARoxetine (PAXIL) 10 MG tablet  Diagnosis and ICD code (if different than what is on RX):  F31.62 /F33.1 /R63.5   New/renewal/insurance change PA/secondary ins. PA:  Previously Tried and Failed:  na  Rationale:  na    Insurance   Primary: BLUE PLUS  Insurance ID:  [id number]     Secondary (if applicable):na  Insurance ID:  na    Pharmacy Information (if different than what is on RX)  Name:  [facility]  Phone:  [phone]  Fax:   [phone]

## 2024-06-05 ENCOUNTER — OFFICE VISIT (OUTPATIENT)
Dept: FAMILY MEDICINE | Facility: CLINIC | Age: 34
End: 2024-06-05
Payer: COMMERCIAL

## 2024-06-05 VITALS
DIASTOLIC BLOOD PRESSURE: 68 MMHG | TEMPERATURE: 97 F | OXYGEN SATURATION: 99 % | BODY MASS INDEX: 34.38 KG/M2 | SYSTOLIC BLOOD PRESSURE: 116 MMHG | RESPIRATION RATE: 16 BRPM | HEART RATE: 68 BPM | WEIGHT: 213 LBS

## 2024-06-05 DIAGNOSIS — Z32.00 PREGNANCY EXAMINATION OR TEST, PREGNANCY UNCONFIRMED: ICD-10-CM

## 2024-06-05 DIAGNOSIS — E03.9 HYPOTHYROIDISM, UNSPECIFIED TYPE: ICD-10-CM

## 2024-06-05 DIAGNOSIS — Z30.430 ENCOUNTER FOR INSERTION OF INTRAUTERINE CONTRACEPTIVE DEVICE: Primary | ICD-10-CM

## 2024-06-05 LAB — HCG UR QL: NEGATIVE

## 2024-06-05 PROCEDURE — 36415 COLL VENOUS BLD VENIPUNCTURE: CPT | Performed by: PHYSICIAN ASSISTANT

## 2024-06-05 PROCEDURE — 84443 ASSAY THYROID STIM HORMONE: CPT | Performed by: PHYSICIAN ASSISTANT

## 2024-06-05 PROCEDURE — 81025 URINE PREGNANCY TEST: CPT | Performed by: PHYSICIAN ASSISTANT

## 2024-06-05 PROCEDURE — 58300 INSERT INTRAUTERINE DEVICE: CPT | Performed by: PHYSICIAN ASSISTANT

## 2024-06-05 RX ORDER — IBUPROFEN 200 MG
600 TABLET ORAL ONCE
Status: COMPLETED | OUTPATIENT
Start: 2024-06-05 | End: 2024-06-05

## 2024-06-05 RX ADMIN — Medication 600 MG: at 15:35

## 2024-06-05 NOTE — PROGRESS NOTES
Assessment & Plan     Encounter for insertion of intrauterine contraceptive device  Evita Cornelius is a 34 year old female who presents today requesting placement of an intrauterine device.   The patient meets and is agreeable to the following conditions:   She is parous.   She is not interested in conception in the near future.   She currently is in a stable, monogamous relationship.   There is no previous history of pelvic inflammatory disease.   There is no previous history of ectopic pregnancy.   She is willing to check monthly for the IUD string.   She is at least 8 weeks post-partum.   There is no history of unresolved abnormal uterine bleeding.   There is no history of an unresolved abnormal PAP smear.   She has no history of Christiano's disease or an allergy to copper (for ParaGard).   She has no history of diabetes, AIDS, leukemia, IV drug use or chronic steroid use.   She is willing to return annually for pelvic exams.   She is current on pap smear testing.   She denies the possibility of pregnancy.   Pregnancy test today is negative.   The following risks were discussed with the patient:   Possibility of pregnancy and ectopic pregnancy.   Possibility of pelvic inflammatory disease, particularly with new partners.   Risk of uterine perforation or IUD expulsion.   Possibility of difficult removal.   Spotting or heavy bleeding.   Cramping, pain or infection during or after insertion.   The patient was given patient information on the IUD and the patient education brochure from the . The patient has given consent to proceed with placement of the IUD. A written consent form is present in the chart. She wishes to proceed.   PROCEDURE:   Type of IUD: Mirena  She is placed in a dorsal lithotomy potion and a pelvic exam is performed to determine the position of the uterus. The cervix is identified and cleaned with betadine three times. A single tooth tenaculum is applied to the anterior lip of the  cervix for stabilization. The uterus is sounded to 9 cm and removed. (Target sound depth is 6.5 cm to 8.5 cm.) The IUD insertion tube is prepared to manufacturers recommendations and inserted into the uterus under sterile conditions to the sounding depth. The arms of the IUD are then opened by withdrawing the insertion tube 2.0 cm while stabilizing the solid insertion hannah without difficulty. The IUD string is then cut to 5.0 cm.   The patient tolerated this procedure without immediate complication. The patient is to return or call immediately for any unexplained fever, abdominal or pelvic pain, excessive bleeding, possibility of pregnancy, foul-smelling discharge, sense that the IUD has been expelled.  Follow-up in 3-4 weeks for a recheck.  Connie Silva PA-C           Mirena    Paraguard    [id number] OR INSERT INTRAUTERINE DEVICE   [id number] OR REMOVE INTRAUTERINE DEVICE     - levonorgestrel (MIRENA) 52 MG (20 mcg/day) IUD 1 each  - INSERTION INTRAUTERINE DEVICE  - levonorgestrel (MIRENA) 52 MG (20 mcg/day) IUD; 1 each by Intrauterine route daily  - ibuprofen (ADVIL/MOTRIN) tablet 600 mg    Pregnancy examination or test, pregnancy unconfirmed  Negative, currently on day 1 of menses.  Bleeding is not heavy.   - HCG Qual, Urine - CSC and Range ([id number])    Hypothyroidism, unspecified type  Due to recheck levels.   - TSH                Subjective   Evita is a 34 year old, presenting for the following health issues:  Procedure (IUD Placement )        6/5/2024     2:42 PM   Additional Questions   Roomed by Jeanne   Accompanied by Self         6/5/2024     2:42 PM   Patient Reported Additional Medications   Patient reports taking the following new medications na     History of Present Illness       Reason for visit:  Weight gain    She eats 2-3 servings of fruits and vegetables daily.She consumes 3 sweetened beverage(s) daily.She exercises with enough effort to increase her heart rate 30 to 60 minutes per day.   She exercises with enough effort to increase her heart rate 4 days per week.   She is taking medications regularly.  Started injectable weight loss medication last week.  Tolerating fine.       Patient arrived for Mirena IUD Placement, consult done 05/20/24.     HCG collected upon arrival.     Menstrual Cycle started today 06/05/24.     Patient did not taken OTC Pain reliever before arriving, declined offered Ibuprofen and Tylenol.             Review of Systems  Constitutional, HEENT, cardiovascular, pulmonary, gi and gu systems are negative, except as otherwise noted.      Objective    /68 (BP Location: Right arm, Patient Position: Chair, Cuff Size: Adult Large)   Pulse 68   Temp 97  F (36.1  C) (Tympanic)   Resp 16   Wt 96.6 kg (213 lb)   LMP 06/05/2024 (Exact Date)   SpO2 99%   BMI 34.38 kg/m    Body mass index is 34.38 kg/m .  Physical Exam   GENERAL: alert and no distress   (female): normal female external genitalia, normal urethral meatus, normal vaginal mucosa    Results for orders placed or performed in visit on 06/05/24 (from the past 24 hour(s))   HCG Qual, Urine - CSC and Range ([id number])   Result Value Ref Range    hCG Urine Qualitative Negative Negative           Signed Electronically by: Connie Silva PA-C

## 2024-06-06 LAB — TSH SERPL DL<=0.005 MIU/L-ACNC: 0.08 UIU/ML (ref 0.3–4.2)

## 2024-06-06 RX ORDER — LEVOTHYROXINE SODIUM 200 UG/1
200 TABLET ORAL
Qty: 90 TABLET | Refills: 0 | Status: SHIPPED | OUTPATIENT
Start: 2024-06-06 | End: 2024-06-07 | Stop reason: DRUGHIGH

## 2024-06-07 ENCOUNTER — TELEPHONE (OUTPATIENT)
Dept: FAMILY MEDICINE | Facility: CLINIC | Age: 34
End: 2024-06-07
Payer: COMMERCIAL

## 2024-06-07 DIAGNOSIS — E03.9 HYPOTHYROIDISM, UNSPECIFIED TYPE: Primary | ICD-10-CM

## 2024-06-07 RX ORDER — LEVOTHYROXINE SODIUM 175 UG/1
175 TABLET ORAL DAILY
Qty: 90 TABLET | Refills: 0 | Status: SHIPPED | OUTPATIENT
Start: 2024-06-07 | End: 2024-09-17

## 2024-06-07 NOTE — TELEPHONE ENCOUNTER
RN called to relay providers message. Patient verbalized understanding. Patient had a questions about visit on 6/5/24 with Connie Silva when she had her Mirena IUD placed.     Patient wants to know how soon this is effective and when it is ok to have intercourse again?    RN did not see notes for this in visit and patient stated she did not get information on this but was told to wait to use tampons for 7 days after insertion.     Patient stated ok to communicate this in [employer] message if she does not answer upon call back.     Please advise.       Kathy Moreno RN on 6/7/2024 at 2:35 PM

## 2024-06-07 NOTE — TELEPHONE ENCOUNTER
If IUD was placed 6/5: Please let patient know she can go ahead and proceed with intercourse at this point.    Enma Stevens PA-C on 6/7/2024 at 4:18 PM

## 2024-06-07 NOTE — TELEPHONE ENCOUNTER
RN spoke with Leonela, pharmacist at [facility] to cancel the Levothyroxine 25 mcg script.     Patient stated she has only been taking 200 mcg for the past month.  Should she decrease dose more knowing this?       Ivy Jordan RN on 6/7/2024 at 10:43 AM

## 2024-06-07 NOTE — TELEPHONE ENCOUNTER
RN attempted to call. Patient did not answer, RN left . RN sent [place] [employer] message as patient had requested previously if she did not answer her phone when calling.       Kathy Moreno RN on 6/7/2024 at 4:33 PM

## 2024-06-07 NOTE — TELEPHONE ENCOUNTER
Good catch, yes lets decrease patient's dose of levothyroxine to 175mcg daily.  I went ahead and sent this to patient's pharmacy.  She should discontinue the 200 mcg tablets.    Enma Stevens PA-C on 6/7/2024 at 2:24 PM

## 2024-06-07 NOTE — TELEPHONE ENCOUNTER
Attempted to call pharmacy. They are closed. Will attempt at later time.    Ivy Jordan RN on 6/7/2024 at 7:27 AM

## 2024-06-07 NOTE — TELEPHONE ENCOUNTER
----- Message from Connie Silva PA-C sent at 6/6/2024  6:15 PM CDT -----  Please call the pharmacy and cancel the script on file for levothyroxine 25 mcg (as she was taking this along with 200 mcg).  Dose decreased to 200 mcg.   Connie Silva PA-C      Dear Evita,     Your thyroid levels are too high.  I suggest you cut back on your medication from 225 mcg to 200 mcg and recheck levels in 3 months.     Please follow-up if you have any questions or concerns.     Sincerely,     Connie Silva PA-C   Written by Connie Silva PA-C on 6/6/2024  6:15 PM CDT

## 2024-06-11 NOTE — TELEPHONE ENCOUNTER
Prior Authorization Approval    Authorization Effective Date: 3/13/2024  Authorization Expiration Date: 7/11/2024  Medication: PARoxetine (PAXIL) 10 MG tablet  Approved Dose/Quantity:    Reference #:     Insurance Company: [employer] [place] - Phone [phone] Fax [phone]  Expected CoPay:       CoPay Card Available:      Foundation Assistance Needed:    Which Pharmacy is filling the prescription (Not needed for infusion/clinic administered): [id number] - [place], [place] - [address] AT [address]  Pharmacy Notified:  Yes  Patient Notified:  **Instructed pharmacy to notify patient when script is ready to /ship.**

## 2024-06-11 NOTE — TELEPHONE ENCOUNTER
Central Prior Authorization Team   Phone: [phone]    PA Initiation    Medication: PARoxetine (PAXIL) 10 MG tablet  Insurance Company: [employer] [place] - Phone [phone] Fax [phone]  Pharmacy Filling the Rx: [id number] - [place], [place] - [address]  Filling Pharmacy Phone: [phone]  Filling Pharmacy Fax:    Start Date: 6/11/2024

## 2024-06-26 ASSESSMENT — PATIENT HEALTH QUESTIONNAIRE - PHQ9
10. IF YOU CHECKED OFF ANY PROBLEMS, HOW DIFFICULT HAVE THESE PROBLEMS MADE IT FOR YOU TO DO YOUR WORK, TAKE CARE OF THINGS AT HOME, OR GET ALONG WITH OTHER PEOPLE: NOT DIFFICULT AT ALL
SUM OF ALL RESPONSES TO PHQ QUESTIONS 1-9: 3
SUM OF ALL RESPONSES TO PHQ QUESTIONS 1-9: 3

## 2024-06-27 ENCOUNTER — OFFICE VISIT (OUTPATIENT)
Dept: FAMILY MEDICINE | Facility: CLINIC | Age: 34
End: 2024-06-27
Payer: COMMERCIAL

## 2024-06-27 VITALS
HEIGHT: 66 IN | RESPIRATION RATE: 16 BRPM | TEMPERATURE: 97.2 F | WEIGHT: 208 LBS | SYSTOLIC BLOOD PRESSURE: 106 MMHG | BODY MASS INDEX: 33.43 KG/M2 | HEART RATE: 83 BPM | OXYGEN SATURATION: 96 % | DIASTOLIC BLOOD PRESSURE: 74 MMHG

## 2024-06-27 DIAGNOSIS — E03.9 HYPOTHYROIDISM, UNSPECIFIED TYPE: ICD-10-CM

## 2024-06-27 DIAGNOSIS — Z30.431 IUD CHECK UP: ICD-10-CM

## 2024-06-27 DIAGNOSIS — Z23 ENCOUNTER FOR IMMUNIZATION: ICD-10-CM

## 2024-06-27 DIAGNOSIS — E66.9 OBESITY (BMI 30-39.9): Primary | ICD-10-CM

## 2024-06-27 PROCEDURE — 90715 TDAP VACCINE 7 YRS/> IM: CPT | Performed by: PHYSICIAN ASSISTANT

## 2024-06-27 PROCEDURE — 90471 IMMUNIZATION ADMIN: CPT | Performed by: PHYSICIAN ASSISTANT

## 2024-06-27 PROCEDURE — 99213 OFFICE O/P EST LOW 20 MIN: CPT | Mod: 25 | Performed by: PHYSICIAN ASSISTANT

## 2024-06-27 NOTE — PROGRESS NOTES
Assessment & Plan     Obesity (BMI 30-39.9)  Doing well with weight loss efforts and tolerating medication without any significant side effects.    Wt Readings from Last 4 Encounters:   06/27/24 94.3 kg (208 lb)   06/05/24 96.6 kg (213 lb)   05/29/24 97.1 kg (214 lb)   04/24/24 97.3 kg (214 lb 9.6 oz)       Continues to work on lifestyle changes for weight loss including healthy diet and regular exercise.        - Semaglutide-Weight Management (WEGOVY) 0.5 MG/0.5ML pen; Inject 0.5 mg Subcutaneous once a week    IUD check up  Doing well with IUD, no concerns.   Discussed Kegel exercises (she c/o urinary urgency at times if she has a full bladder, no incontinence).      Encounter for immunization  Due for Tdap, given today.   Declines covid vaccine.     Hypothyroidism, unspecified type  Dose decreased to 175 mcg and due to recheck labs in 3 months.  Orders in place.         The longitudinal plan of care for the diagnosis(es)/condition(s) as documented were addressed during this visit. Due to the added complexity in care, I will continue to support Evita in the subsequent management and with ongoing continuity of care.          Shin Jama is a 34 year old, presenting for the following health issues:  IUD (Check) and Weight Check        6/27/2024     9:44 AM   Additional Questions   Roomed by Jeanne   Accompanied by Self       Patient arrived for IUD Check and Weight Management Follow-up.     Mirena IUD placed 06/05/24. Patient reports no cramping or episodes of bleeding out of Period, menstrual cycle started 4 days ago.     Medication Followup of Wegovy   Taking Medication as prescribed: yes  Side Effects:  None  Medication Helping Symptoms:  yes    Last recorded weight 06/05/24; 213lbs  Todays collected weight; 208lbs    History of Present Illness       Reason for visit:  Iud check    She eats 2-3 servings of fruits and vegetables daily.She consumes 2 sweetened beverage(s) daily.She exercises with enough  "effort to increase her heart rate 10 to 19 minutes per day.  She exercises with enough effort to increase her heart rate 4 days per week.   She is taking medications regularly.           Review of Systems  Constitutional, neuro, ENT, endocrine, pulmonary, cardiac, gastrointestinal, genitourinary, musculoskeletal, integument and psychiatric systems are negative, except as otherwise noted.      Objective    /74 (BP Location: Right arm, Patient Position: Chair, Cuff Size: Adult Large)   Pulse 83   Temp 97.2  F (36.2  C) (Tympanic)   Resp 16   Ht 1.676 m (5' 6\")   Wt 94.3 kg (208 lb)   LMP 06/05/2024 (Exact Date)   SpO2 96%   BMI 33.57 kg/m    Body mass index is 33.57 kg/m .  Physical Exam   GENERAL: alert and no distress   (female): normal female external genitalia, normal urethral meatus, normal vaginal mucosa, IUD string noted at cervical os.            Signed Electronically by: Connie Silva PA-C    "

## 2024-07-24 ENCOUNTER — OFFICE VISIT (OUTPATIENT)
Dept: FAMILY MEDICINE | Facility: CLINIC | Age: 34
End: 2024-07-24
Payer: COMMERCIAL

## 2024-07-24 VITALS
DIASTOLIC BLOOD PRESSURE: 62 MMHG | HEIGHT: 65 IN | BODY MASS INDEX: 34.82 KG/M2 | TEMPERATURE: 98.4 F | SYSTOLIC BLOOD PRESSURE: 106 MMHG | WEIGHT: 209 LBS | OXYGEN SATURATION: 99 % | HEART RATE: 65 BPM

## 2024-07-24 DIAGNOSIS — Z72.51 UNPROTECTED SEX: ICD-10-CM

## 2024-07-24 DIAGNOSIS — B37.31 YEAST INFECTION OF THE VAGINA: ICD-10-CM

## 2024-07-24 LAB
C TRACH DNA SPEC QL NAA+PROBE: NEGATIVE
CLUE CELLS: ABNORMAL
HIV 1+2 AB+HIV1 P24 AG SERPL QL IA: NONREACTIVE
N GONORRHOEA DNA SPEC QL NAA+PROBE: NEGATIVE
TRICHOMONAS, WET PREP: ABNORMAL
WBC'S/HIGH POWER FIELD, WET PREP: ABNORMAL
YEAST, WET PREP: PRESENT

## 2024-07-24 PROCEDURE — 36415 COLL VENOUS BLD VENIPUNCTURE: CPT | Performed by: STUDENT IN AN ORGANIZED HEALTH CARE EDUCATION/TRAINING PROGRAM

## 2024-07-24 PROCEDURE — 87491 CHLMYD TRACH DNA AMP PROBE: CPT | Performed by: STUDENT IN AN ORGANIZED HEALTH CARE EDUCATION/TRAINING PROGRAM

## 2024-07-24 PROCEDURE — 87210 SMEAR WET MOUNT SALINE/INK: CPT | Performed by: STUDENT IN AN ORGANIZED HEALTH CARE EDUCATION/TRAINING PROGRAM

## 2024-07-24 PROCEDURE — 87389 HIV-1 AG W/HIV-1&-2 AB AG IA: CPT | Performed by: STUDENT IN AN ORGANIZED HEALTH CARE EDUCATION/TRAINING PROGRAM

## 2024-07-24 PROCEDURE — 87591 N.GONORRHOEAE DNA AMP PROB: CPT | Performed by: STUDENT IN AN ORGANIZED HEALTH CARE EDUCATION/TRAINING PROGRAM

## 2024-07-24 PROCEDURE — 99213 OFFICE O/P EST LOW 20 MIN: CPT | Performed by: STUDENT IN AN ORGANIZED HEALTH CARE EDUCATION/TRAINING PROGRAM

## 2024-07-24 RX ORDER — FLUCONAZOLE 150 MG/1
150 TABLET ORAL ONCE
Qty: 1 TABLET | Refills: 0 | Status: SHIPPED | OUTPATIENT
Start: 2024-07-24 | End: 2024-07-24

## 2024-07-24 NOTE — PROGRESS NOTES
"  {PROVIDER CHARTING PREFERENCE:628733}    Shin Jama is a 34 year old, presenting for the following health issues:  IUD Problem (Complains of cervical pain started yesterday, IUD was placed 1 month ago)      7/24/2024     9:38 AM   Additional Questions   Roomed by Kala PEREIRA         7/24/2024     9:38 AM   Patient Reported Additional Medications   Patient reports taking the following new medications No new medications to add     History of Present Illness       Reason for visit:  Reaction to iud    She eats 2-3 servings of fruits and vegetables daily.She consumes 2 sweetened beverage(s) daily.She exercises with enough effort to increase her heart rate 30 to 60 minutes per day.  She exercises with enough effort to increase her heart rate 7 days per week.   She is taking medications regularly.       {MA/LPN/RN Pre-Provider Visit Orders- hCG/UA/Strep (Optional):551742}  {SUPERLIST (Optional):200036}  {additonal problems for provider to add (Optional):197971}    {ROS Picklists (Optional):775677}      Objective    /62 (BP Location: Right arm, Patient Position: Sitting, Cuff Size: Adult Large)   Pulse 65   Temp 98.4  F (36.9  C) (Tympanic)   Ht 1.651 m (5' 5\")   Wt 94.8 kg (209 lb)   LMP 06/05/2024 (Exact Date)   SpO2 99%   BMI 34.78 kg/m    Body mass index is 34.78 kg/m .  Physical Exam   {Exam List (Optional):204587}    {Diagnostic Test Results (Optional):329272}        Signed Electronically by: Alejandra Garay MD  {Email feedback regarding this note to [email or web address]   :467340}  "

## 2024-07-24 NOTE — PROGRESS NOTES
"  Assessment & Plan     Yeast infection of the vagina  Pelvic exam is unremarkable. The symptom is unrelated to the insertion of the IUD.  - fluconazole (DIFLUCAN) 150 MG tablet; Take 1 tablet (150 mg) by mouth once for 1 dose    Unprotected sex    - Chlamydia trachomatis PCR - Clinic Collect  - Neisseria gonorrhoeae PCR - Clinic Collect  - HIV Antigen Antibody Combo; Future  - Wet prep - lab collect  - HIV Antigen Antibody Combo              Shin Jama is a 34 year old, presenting for the following health issues:  IUD Problem (Complains of cervical pain started yesterday, IUD was placed 1 month ago) and STD (Requesting testing, unprotected sex)      7/24/2024     9:38 AM   Additional Questions   Roomed by Kala PEREIRA         7/24/2024     9:38 AM   Patient Reported Additional Medications   Patient reports taking the following new medications No new medications to add     STD    History of Present Illness       Reason for visit:  IUD Problem (Complains of cervical pain started yesterday, IUD was placed 1 month ago) and STD (Requesting testing, unprotected sex    She eats 2-3 servings of fruits and vegetables daily.She consumes 2 sweetened beverage(s) daily.She exercises with enough effort to increase her heart rate 30 to 60 minutes per day.  She exercises with enough effort to increase her heart rate 7 days per week.   She is taking medications regularly.       Patient complains of itching and warmth in the vagina. She denies any pelvic or abdominal cramp or pain.      Review of Systems  Constitutional, HEENT, cardiovascular, pulmonary, gi and gu systems are negative, except as otherwise noted.      Objective    /62 (BP Location: Right arm, Patient Position: Sitting, Cuff Size: Adult Large)   Pulse 65   Temp 98.4  F (36.9  C) (Tympanic)   Ht 1.651 m (5' 5\")   Wt 94.8 kg (209 lb)   LMP 06/05/2024 (Exact Date)   SpO2 99%   BMI 34.78 kg/m    Body mass index is 34.78 kg/m .  Physical Exam "   GENERAL: alert and no distress  ABDOMEN: soft, nontender,   MS.: no gross musculoskeletal defects noted, no edema   (female): normal female external genitalia, normal urethral meatus, vaginal mucosa pink, moist, well rugated, normal cervix/adnexa/uterus without masses or discharge,non-erythematous,  IUD string in place. Perineum was normal on inspection.            Signed Electronically by: Alejandra Garay MD

## 2024-07-24 NOTE — PATIENT INSTRUCTIONS
Fercho Jama,    Thank you for allowing [facility] to manage your care.        For your convenience, test results are released as soon as they are available  Please allow 1-2 business days for me to send you a comment about your results.  If not done so, I encourage you to login into [email or web address] ([email or web address] to review your results in real time.     If you have any questions or concerns, please feel free to call us at [phone].    Sincerely,    Dr. Garay    Did you know?      You can schedule a video visit for follow-up appointments as well as future appointments for certain conditions.  Please see the below link.     [email or web address]    If you have not already done so,  I encourage you to sign up for [employer] ([email or web address]).  This will allow you to review your results, securely communicate with a provider, and schedule virtual visits as well.\

## 2024-07-30 ENCOUNTER — OFFICE VISIT (OUTPATIENT)
Dept: FAMILY MEDICINE | Facility: CLINIC | Age: 34
End: 2024-07-30
Payer: COMMERCIAL

## 2024-07-30 VITALS
OXYGEN SATURATION: 100 % | HEART RATE: 89 BPM | SYSTOLIC BLOOD PRESSURE: 99 MMHG | TEMPERATURE: 97.8 F | WEIGHT: 209.6 LBS | DIASTOLIC BLOOD PRESSURE: 66 MMHG | RESPIRATION RATE: 20 BRPM | HEIGHT: 65 IN | BODY MASS INDEX: 34.92 KG/M2

## 2024-07-30 DIAGNOSIS — N30.00 ACUTE CYSTITIS WITHOUT HEMATURIA: Primary | ICD-10-CM

## 2024-07-30 LAB
ALBUMIN UR-MCNC: NEGATIVE MG/DL
APPEARANCE UR: CLEAR
BACTERIA #/AREA URNS HPF: ABNORMAL /HPF
BILIRUB UR QL STRIP: NEGATIVE
COLOR UR AUTO: YELLOW
GLUCOSE UR STRIP-MCNC: NEGATIVE MG/DL
HGB UR QL STRIP: ABNORMAL
KETONES UR STRIP-MCNC: NEGATIVE MG/DL
LEUKOCYTE ESTERASE UR QL STRIP: ABNORMAL
MUCOUS THREADS #/AREA URNS LPF: PRESENT /LPF
NITRATE UR QL: NEGATIVE
PH UR STRIP: 5.5 [PH] (ref 5–7)
RBC #/AREA URNS AUTO: ABNORMAL /HPF
SP GR UR STRIP: >=1.03 (ref 1–1.03)
SQUAMOUS #/AREA URNS AUTO: ABNORMAL /LPF
UROBILINOGEN UR STRIP-ACNC: 0.2 E.U./DL
WBC #/AREA URNS AUTO: ABNORMAL /HPF

## 2024-07-30 PROCEDURE — 87086 URINE CULTURE/COLONY COUNT: CPT | Performed by: PHYSICIAN ASSISTANT

## 2024-07-30 PROCEDURE — 99213 OFFICE O/P EST LOW 20 MIN: CPT | Performed by: PHYSICIAN ASSISTANT

## 2024-07-30 PROCEDURE — 81001 URINALYSIS AUTO W/SCOPE: CPT | Performed by: PHYSICIAN ASSISTANT

## 2024-07-30 RX ORDER — SULFAMETHOXAZOLE/TRIMETHOPRIM 800-160 MG
1 TABLET ORAL 2 TIMES DAILY
Qty: 6 TABLET | Refills: 0 | Status: SHIPPED | OUTPATIENT
Start: 2024-07-30 | End: 2024-08-02

## 2024-07-30 ASSESSMENT — ENCOUNTER SYMPTOMS
FEVER: 0
NAUSEA: 0
ABDOMINAL PAIN: 0
FLANK PAIN: 0
VOMITING: 0
FREQUENCY: 1
DYSURIA: 1
HEMATURIA: 0

## 2024-07-30 ASSESSMENT — PAIN SCALES - GENERAL: PAINLEVEL: NO PAIN (0)

## 2024-07-30 NOTE — PROGRESS NOTES
Assessment & Plan     Acute cystitis without hematuria  Patient is a 34-year-old female who presents to clinic due to 1 day of dysuria, urgency, frequency.  Vital signs normal.  Urinalysis does show UTI.  Low suspicion for pyelonephritis given normal vital signs and no acute abnormalities on exam. Bactrim prescribed.  Follow-up precautions provided.  - UA Macroscopic with reflex to Microscopic and Culture - Lab Collect; Future  - UA Macroscopic with reflex to Microscopic and Culture - Lab Collect  - UA Microscopic with Reflex to Culture  - Urine Culture  - sulfamethoxazole-trimethoprim (BACTRIM DS) 800-160 MG tablet; Take 1 tablet by mouth 2 times daily for 3 days            See Patient Instructions    Shin Jama is a 34 year old, presenting for the following health issues:  UTI      7/30/2024     3:40 PM   Additional Questions   Roomed by Judit NORWOOD MA   Accompanied by No one         7/30/2024     3:40 PM   Patient Reported Additional Medications   Patient reports taking the following new medications None     History of Present Illness       Reason for visit:  Reaction to iud    She eats 2-3 servings of fruits and vegetables daily.She consumes 2 sweetened beverage(s) daily.She exercises with enough effort to increase her heart rate 30 to 60 minutes per day.  She exercises with enough effort to increase her heart rate 7 days per week.   She is taking medications regularly.     Patient notes 1 day of dysuria, frequency, urgency.  No fevers.  Patient recently treated for yeast infection and bacterial vaginosis and symptoms have improved.  No vaginal discharge.      Review of Systems   Constitutional:  Negative for fever.   Gastrointestinal:  Negative for abdominal pain, nausea and vomiting.   Genitourinary:  Positive for dysuria, frequency and urgency. Negative for flank pain, hematuria and vaginal discharge.           Objective    BP 99/66   Pulse 89   Temp 97.8  F (36.6  C) (Temporal)   Resp 20   Ht  "1.651 m (5' 5\")   Wt 95.1 kg (209 lb 9.6 oz)   LMP 06/05/2024 (Exact Date)   SpO2 100%   Breastfeeding No   BMI 34.88 kg/m    Body mass index is 34.88 kg/m .  Physical Exam  Vitals and nursing note reviewed.   Constitutional:       General: She is not in acute distress.     Appearance: Normal appearance.   HENT:      Head: Normocephalic and atraumatic.   Eyes:      Extraocular Movements: Extraocular movements intact.      Pupils: Pupils are equal, round, and reactive to light.   Cardiovascular:      Rate and Rhythm: Normal rate and regular rhythm.      Heart sounds: Normal heart sounds.   Pulmonary:      Effort: Pulmonary effort is normal.      Breath sounds: Normal breath sounds.   Abdominal:      General: Bowel sounds are normal.      Palpations: Abdomen is soft.      Tenderness: There is no abdominal tenderness. There is no right CVA tenderness, left CVA tenderness, guarding or rebound.   Musculoskeletal:         General: Normal range of motion.      Cervical back: Normal range of motion.   Skin:     General: Skin is warm and dry.   Neurological:      General: No focal deficit present.      Mental Status: She is alert.   Psychiatric:         Mood and Affect: Mood normal.         Behavior: Behavior normal.            Results for orders placed or performed in visit on 07/30/24   UA Macroscopic with reflex to Microscopic and Culture - Lab Collect     Status: Abnormal    Specimen: Urine, Midstream   Result Value Ref Range    Color Urine Yellow Colorless, Straw, Light Yellow, Yellow    Appearance Urine Clear Clear    Glucose Urine Negative Negative mg/dL    Bilirubin Urine Negative Negative    Ketones Urine Negative Negative mg/dL    Specific Gravity Urine >=1.030 1.003 - 1.035    Blood Urine Trace (A) Negative    pH Urine 5.5 5.0 - 7.0    Protein Albumin Urine Negative Negative mg/dL    Urobilinogen Urine 0.2 0.2, 1.0 E.U./dL    Nitrite Urine Negative Negative    Leukocyte Esterase Urine Small (A) Negative   UA " Microscopic with Reflex to Culture     Status: Abnormal   Result Value Ref Range    Bacteria Urine Moderate (A) None Seen /HPF    RBC Urine 2-5 (A) 0-2 /HPF /HPF    WBC Urine 25-50 (A) 0-5 /HPF /HPF    Squamous Epithelials Urine Moderate (A) None Seen /LPF    Mucus Urine Present (A) None Seen /LPF             Signed Electronically by: Enma Stevens PA-C

## 2024-07-30 NOTE — PATIENT INSTRUCTIONS
Your lab work does show a urinary tract infection.  For treatment you have been prescribed Bactrim, an antibiotic.  Start this medication today.  Reach out with questions or concerns and follow-up in clinic for any new or worsening symptoms.

## 2024-08-01 ENCOUNTER — E-VISIT (OUTPATIENT)
Dept: FAMILY MEDICINE | Facility: CLINIC | Age: 34
End: 2024-08-01
Payer: COMMERCIAL

## 2024-08-01 DIAGNOSIS — R30.0 DYSURIA: Primary | ICD-10-CM

## 2024-08-01 DIAGNOSIS — M54.50 LOW BACK PAIN, UNSPECIFIED BACK PAIN LATERALITY, UNSPECIFIED CHRONICITY, UNSPECIFIED WHETHER SCIATICA PRESENT: Primary | ICD-10-CM

## 2024-08-01 LAB — BACTERIA UR CULT: NO GROWTH

## 2024-08-01 PROCEDURE — 99421 OL DIG E/M SVC 5-10 MIN: CPT | Performed by: PHYSICIAN ASSISTANT

## 2024-08-01 RX ORDER — CYCLOBENZAPRINE HCL 10 MG
10 TABLET ORAL 3 TIMES DAILY PRN
Qty: 30 TABLET | Refills: 0 | Status: SHIPPED | OUTPATIENT
Start: 2024-08-01

## 2024-08-01 NOTE — LETTER
[facility]  [address]  [place] [address]  Phone: [phone]    August 1, 2024        Evita Cornelius  [address]  [place] [address]          To whom it may concern:    RE: Evita Cornelius    Patient was evaluated in clinic today.  I do not recommend she lift over 10 lbs for the next 1 week (through 8/8/24).     Please contact me for questions or concerns.      Sincerely,    Connie Silva PA-C

## 2024-08-01 NOTE — PATIENT INSTRUCTIONS
Mini Relaxation Exercises  Source [email or web address]    Mini relaxation exercises are focused breathing techniques that help reduce  anxiety and tension immediately. You can do them with your eyes open or  closed. You can do them any place, at any time; no one will know that you are  doing them.    Good Times to Do a  Mini     Before taking an exam    While at the computer lab waiting for your document to print    While waiting in line    When someone says something that bothers you    While waiting for the announcement of a grade    While waiting for a phone call    In the dentist s chair    When you feel overwhelmed by what you need to accomplish in the near  future    When in pain    When you want to     Mini Version 1- Breath Focused  Position yourself in a supportive comfortable chair or lying in a position that is least painful. (Often this is on your back with pillows under your knees)    Count very slowly to yourself from ten down to zero, one number for  each breath. Thus, with the first diaphragmatic breath, you say  ten  to  yourself, with the next breath, you say  nine , etc. If you start feeling  light-headed or dizzy, slow down the counting. When you get to  zero ,  see how you are feeling. If you are feeling better, great! If not, try to  do it again.    b. As you inhale, count very slowly up to four; as you exhale, count slowly  back down to one. Thus, as you inhale, you say to yourself  one, two,  three, four , as you exhale, you say to yourself  four, three, two, one .  Do this several times.    c. After each inhalation, pause for a few seconds; after you exhale, pause  again for a few seconds. Do this for several breaths.    Mini Version 2- Physical Focused  a. Massage your forehead, jaw, back of neck, and shoulders  b. Stretch and yawn  c. Walk counting four paces as you breathe in and four paces as you  breathe out  d. Coordinate your breath with any activity, for example, writing,  jogging,  drawing, lifting weights, walking, etc.    Mini Version 3- Imagery Focused  Position yourself in a supportive comfortable chair or lying in a position that is least painful. (Often this is on your back with pillows under your knees)    a. Briefly picture yourself in a place you find relaxing  b. Contemplate a picture of a natural scene  c. Imagine the characteristics of one of the following or any other object  which portrays characteristics you find calming or supportive in the  moment:    Tree (strong, rooted, expansive)    Mountain (timeless, strong, stable)    Waves (fluid, limitless)    Sun (radiant, warm)    Wind (free)    Mini Version 4- Mindfulness Focused  a. Look out the window for a few moments  b. Notice something new  c. Listen. What is the most distant sound you hear? The next distant?  d. Appreciate the sensation of being in the situation, the feel, smell, sight of  certain objects  e. Focus on being exactly where you are, keeping your thoughts from flowing  into the future or past

## 2024-08-09 ENCOUNTER — OFFICE VISIT (OUTPATIENT)
Dept: FAMILY MEDICINE | Facility: CLINIC | Age: 34
End: 2024-08-09
Payer: COMMERCIAL

## 2024-08-09 VITALS
TEMPERATURE: 97.6 F | SYSTOLIC BLOOD PRESSURE: 107 MMHG | WEIGHT: 207 LBS | OXYGEN SATURATION: 98 % | RESPIRATION RATE: 16 BRPM | HEIGHT: 65 IN | BODY MASS INDEX: 34.49 KG/M2 | DIASTOLIC BLOOD PRESSURE: 76 MMHG | HEART RATE: 83 BPM

## 2024-08-09 DIAGNOSIS — L60.0 INGROWN NAIL: Primary | ICD-10-CM

## 2024-08-09 PROCEDURE — 99214 OFFICE O/P EST MOD 30 MIN: CPT | Performed by: FAMILY MEDICINE

## 2024-08-09 NOTE — PROGRESS NOTES
"  Assessment & Plan   Problem List Items Addressed This Visit    None  Visit Diagnoses       Ingrown nail    -  Primary             After informed consent written obtained, toenail was cleaned with iodine and digital block performed with 1% lidocaine without epi. Round backed nail file used to file down rough edge of lateral nail that was pushing skin. Evita demonstrated understanding of how she can perform this similar procedure at home, without anesthesia. EBL none. Bandaged and discharged. Follow up PRN or with podiatry if she wants permanent matrix ablation.       30 minutes spent by me on the date of the encounter doing chart review, history and exam, documentation and further activities per the note      Subjective   Evita is a 34 year old, presenting for the following health issues:  Ingrown Toenail (Left Big toe)        8/9/2024     3:03 PM   Additional Questions   Roomed by Sharri FORD CMA     History of Present Illness       Reason for visit:  Ingrown toe nail    She eats 2-3 servings of fruits and vegetables daily.She consumes 2 sweetened beverage(s) daily.She exercises with enough effort to increase her heart rate 10 to 19 minutes per day.  She exercises with enough effort to increase her heart rate 6 days per week.   She is taking medications regularly.           Objective    /76 (BP Location: Right arm, Patient Position: Chair, Cuff Size: Adult Large)   Pulse 83   Temp 97.6  F (36.4  C) (Temporal)   Resp 16   Ht 1.651 m (5' 5\")   Wt 93.9 kg (207 lb)   LMP 07/15/2024 (Approximate)   SpO2 98%   BMI 34.45 kg/m    Body mass index is 34.45 kg/m .  Physical Exam   ingrowing nail lateral aspect of left great toe, no bleeding, no purulent drainage, no erythema/cellulitis        Signed Electronically by: YUN MONTANEZ DO    "

## 2024-09-04 ENCOUNTER — TELEPHONE (OUTPATIENT)
Dept: FAMILY MEDICINE | Facility: CLINIC | Age: 34
End: 2024-09-04
Payer: COMMERCIAL

## 2024-09-04 DIAGNOSIS — E66.9 OBESITY (BMI 30-39.9): Primary | ICD-10-CM

## 2024-09-04 NOTE — TELEPHONE ENCOUNTER
PA Initiation    Medication: WEGOVY 0.5 MG/0.5ML SC SOAJ  Insurance Company: [employer] - Phone [phone] Fax [phone]  Pharmacy Filling the Rx: [facility] [address] [address] [address] [facility] [address]  Filling Pharmacy Phone: [phone]  Filling Pharmacy Fax:    Start Date: 9/4/2024  Retail Pharmacy Prior Authorization Team   Phone: [phone]

## 2024-09-04 NOTE — TELEPHONE ENCOUNTER
-currently on humira and methotrexate  -following with rheum, Dr. Garcia  -next appt 10/18/23   Please complete a prior authorization for this NEW dose.       Disp Refills Start End ANTHONY    Semaglutide-Weight Management (WEGOVY) 0.5 MG/0.5ML pen 6 mL 1 6/27/2024 -- No   Sig - Route: Inject 0.5 mg Subcutaneous once a week - Subcutaneous   Sent to pharmacy as: Semaglutide-Weight     Ivy Jordan RN on 9/4/2024 at 11:07 AM

## 2024-09-06 NOTE — TELEPHONE ENCOUNTER
PRIOR AUTHORIZATION DENIED  QTY LIMIT DENIAL MAX QTY IS 8 PENS  DAYS    Medication: WEGOVY 0.5 MG/0.5ML SC SOAJ  Insurance Company: [employer] - Phone [phone] Fax [phone]  Denial Date: 9/6/2024  Denial Reason(s):     Appeal Information:     Patient Notified: The clinic should notify the patient of the denial.

## 2024-09-09 NOTE — TELEPHONE ENCOUNTER
Patient calling clinic stating that she still has not been able to get her Wegovy script. Writer relayed that PA has been denied and has been sent on to provider to advise on next steps and if appeal will be made.    Patient asking for callback with any movement on this request.    Thanks,  SAMIR Barreto RN  [facility]

## 2024-09-09 NOTE — TELEPHONE ENCOUNTER
Please notify Evita,   It looks like her insurance has a quantity limit at specific doses.  If she is tolerating the medication fine, we can try to bump up the dose and see if that is covered.  I have sent the next higher dose to the pharmacy.     Let's do a recheck in about 2 months (phone or video visit ok if you can get a home weight).    Connie Silva PA-C

## 2024-09-09 NOTE — TELEPHONE ENCOUNTER
I called and spoke to patient, she is tolerating the 0.5 mg weekly Wegovy fine and is happy to go up on the dose as she has only lost a couple of pounds so far.    She would like to be seen in person, scheduled for 11/6/24.    Advised of Rx sent for the 1 mg weekly dosing, call if problems getting that.    Patient verbalized understanding of and agreement with plan.      Moon VAZQUEZ RN  [facility] Triage

## 2024-09-16 ENCOUNTER — LAB (OUTPATIENT)
Dept: LAB | Facility: CLINIC | Age: 34
End: 2024-09-16
Payer: COMMERCIAL

## 2024-09-16 DIAGNOSIS — E03.9 HYPOTHYROIDISM, UNSPECIFIED TYPE: ICD-10-CM

## 2024-09-16 LAB — TSH SERPL DL<=0.005 MIU/L-ACNC: 0.09 UIU/ML (ref 0.3–4.2)

## 2024-09-16 PROCEDURE — 84443 ASSAY THYROID STIM HORMONE: CPT

## 2024-09-16 PROCEDURE — 36415 COLL VENOUS BLD VENIPUNCTURE: CPT

## 2024-09-17 DIAGNOSIS — E03.9 HYPOTHYROIDISM, UNSPECIFIED TYPE: ICD-10-CM

## 2024-09-17 RX ORDER — LEVOTHYROXINE SODIUM 150 UG/1
150 TABLET ORAL DAILY
Qty: 90 TABLET | Refills: 0 | Status: SHIPPED | OUTPATIENT
Start: 2024-09-17

## 2024-10-09 PROBLEM — M54.50 ACUTE RIGHT-SIDED LOW BACK PAIN WITHOUT SCIATICA: Status: RESOLVED | Noted: 2024-04-08 | Resolved: 2024-10-09

## 2024-10-09 NOTE — PROGRESS NOTES
DISCHARGE  Reason for Discharge: Patient has failed to schedule further appointments.    Equipment Issued: none    Discharge Plan: Patient to continue home program.    Referring Provider:  Alejandra Garay

## 2024-10-26 DIAGNOSIS — E66.9 OBESITY (BMI 30-39.9): ICD-10-CM

## 2024-10-29 RX ORDER — SEMAGLUTIDE 1 MG/.5ML
INJECTION, SOLUTION SUBCUTANEOUS
Qty: 4 ML | Refills: 0 | Status: SHIPPED | OUTPATIENT
Start: 2024-10-29

## 2024-11-06 ENCOUNTER — OFFICE VISIT (OUTPATIENT)
Dept: FAMILY MEDICINE | Facility: CLINIC | Age: 34
End: 2024-11-06
Payer: COMMERCIAL

## 2024-11-06 VITALS
DIASTOLIC BLOOD PRESSURE: 68 MMHG | WEIGHT: 209 LBS | SYSTOLIC BLOOD PRESSURE: 116 MMHG | BODY MASS INDEX: 34.82 KG/M2 | TEMPERATURE: 98.3 F | OXYGEN SATURATION: 96 % | RESPIRATION RATE: 16 BRPM | HEART RATE: 72 BPM | HEIGHT: 65 IN

## 2024-11-06 DIAGNOSIS — E66.9 OBESITY (BMI 30-39.9): Primary | ICD-10-CM

## 2024-11-06 DIAGNOSIS — N89.8 VAGINAL DISCHARGE: ICD-10-CM

## 2024-11-06 DIAGNOSIS — N76.0 BACTERIAL VAGINOSIS: ICD-10-CM

## 2024-11-06 DIAGNOSIS — B96.89 BACTERIAL VAGINOSIS: ICD-10-CM

## 2024-11-06 LAB
CLUE CELLS: PRESENT
TRICHOMONAS, WET PREP: ABNORMAL
WBC'S/HIGH POWER FIELD, WET PREP: ABNORMAL
YEAST, WET PREP: ABNORMAL

## 2024-11-06 PROCEDURE — 87210 SMEAR WET MOUNT SALINE/INK: CPT | Performed by: PHYSICIAN ASSISTANT

## 2024-11-06 PROCEDURE — 99213 OFFICE O/P EST LOW 20 MIN: CPT | Performed by: PHYSICIAN ASSISTANT

## 2024-11-06 PROCEDURE — G2211 COMPLEX E/M VISIT ADD ON: HCPCS | Performed by: PHYSICIAN ASSISTANT

## 2024-11-06 RX ORDER — METRONIDAZOLE 500 MG/1
500 TABLET ORAL 2 TIMES DAILY
Qty: 14 TABLET | Refills: 0 | Status: SHIPPED | OUTPATIENT
Start: 2024-11-06 | End: 2024-11-13

## 2024-11-06 NOTE — PATIENT INSTRUCTIONS
Weight Loss:    Exercise -  Studies show that people who are able to lose weight and keep it off do 3-6 hours/week of aerobic exercise.  This is a lot of time but it is necessary.  In addition to intense activity, frequent short walks can help too. If you can get up and walk for 100 seconds every 30 minutes, that helps lower blood sugar. Try to find a number of activities that you enjoy so that you are not limited by circumstances. Build it into your day. For trips less than a mile, walk.  For less than 3 miles, bike. Park as far away as possible.   Vary the type of exercise you do.  If you always do the same thing, you body is going to stay the same! Add weights, speed, incline, different types of exercise, etc.    Eat foods with a low glycemic index (GI).  These are foods that turn to blood sugar slower.  These prevent hunger and help eliminate wide swings in blood sugar which can cause mood and appetite swings.  Low fat proteins, nuts and whole grains often have a low GI and are good.  Simple sugars such as those found in white rice, baked potatoes and white pasta/bread have a higher glycemic index and should be limited. Download a free donny for your smart phone.  Go to bed calorie-deprived.  Consider drinking a large glass of psyllium (Metamucil) or methylcellulose (Citrucel) prior to larger meals such as dinner.  This will help fill your stomach better and longer than water which will help reduce appetite. Don't worry, it won't cause diarrhea.  Use smaller plates. It sounds silly but people who get rid of their large plates and only eat off the smaller ones lose weight!  Plan ahead. You always make better decisions ahead of time so plan and prepare your future meal(s) early including portion size. Reserve only healthy food like fruits and veggies for spur of the minute eating  Medications: there are many medication options to help with weight loss.   Last, but not least, pay special attention to your emotional  state.  Most maladaptive behavior is done so out of an attempt to sooth one's anxieties and/or regenerate emotional energy.  Eating, like other pleasurable activities, releases dopamine in the pleasure centers of your brain.  This helps counteract the neurological effects of stress but, only briefly.  The dopamine wears off quickly, leaving one with a more empty feeling shortly after.  When you find yourself eating more than you want (or more often), ask yourself what's making you anxious, sad or angry.  Addressing these issues more directly will help minimize 'stress-eating'.

## 2024-11-06 NOTE — PROGRESS NOTES
"  Assessment & Plan     Obesity (BMI 30-39.9)  Doing well with weight loss efforts and tolerating medication without any significant side effects.    Wt Readings from Last 4 Encounters:   11/06/24 94.8 kg (209 lb)   08/09/24 93.9 kg (207 lb)   07/30/24 95.1 kg (209 lb 9.6 oz)   07/24/24 94.8 kg (209 lb)       Continues to work on lifestyle changes for weight loss including healthy diet and regular exercise.      Will increase dose to 1.7 mg.    Limit pasta  - Semaglutide-Weight Management (WEGOVY) 1.7 MG/0.75ML pen; Inject 1.7 mg subcutaneously once a week.    Vaginal discharge    - Wet prep - lab collect    Bacterial vaginosis  Bacterial Vaginosis     Medication started today, see epic for orders.  Patient is to avoid alcohol while on Flagyl.  I briefly discussed the pathophysiology of bacterial vaginosis and outlined the expected course.  I discussed the warning symptoms and signs that indicate an atypical course that would need urgent follow up.    - metroNIDAZOLE (FLAGYL) 500 MG tablet; Take 1 tablet (500 mg) by mouth 2 times daily for 7 days.      The longitudinal plan of care for the diagnosis(es)/condition(s) as documented were addressed during this visit. Due to the added complexity in care, I will continue to support Evita in the subsequent management and with ongoing continuity of care.    Declines flu and covid shots.     BMI  Estimated body mass index is 34.78 kg/m  as calculated from the following:    Height as of this encounter: 1.651 m (5' 5\").    Weight as of this encounter: 94.8 kg (209 lb).   Weight management plan: Discussed healthy diet and exercise guidelines          Subjective   Evita is a 34 year old, presenting for the following health issues:  Recheck Medication        11/6/2024     4:13 PM   Additional Questions   Roomed by Jeanne   Accompanied by Self         11/6/2024     4:13 PM   Patient Reported Additional Medications   Patient reports taking the following new medications NA " "    History of Present Illness       Reason for visit:  Check up She is missing 3 dose(s) of medications per week.  She is not taking prescribed medications regularly due to remembering to take.     Medication Followup of Wegovy  Taking Medication as prescribed: Yes  Side Effects:  None  Medication Helping Symptoms:  Somewhat, pt feels like her weight has been remained mostly unchanged.    Wt Readings from Last 2 Encounters:   11/06/24 94.8 kg (209 lb)   08/09/24 93.9 kg (207 lb)      Currently at 1 mg Wegovy.    Started a few months ago and not noticing much change in weight.  Has noticed some decreased appetite.    No GI symptoms.    No abdominal pains.     She tries to eat healthy and exercise (pasta is difficult for her to limit).       Review of Systems  Constitutional, neuro, ENT, endocrine, pulmonary, cardiac, gastrointestinal, genitourinary, musculoskeletal, integument and psychiatric systems are negative, except as otherwise noted.      Objective    /68 (BP Location: Left arm, Patient Position: Chair, Cuff Size: Adult Large)   Pulse 72   Temp 98.3  F (36.8  C) (Tympanic)   Resp 16   Ht 1.651 m (5' 5\")   Wt 94.8 kg (209 lb)   SpO2 96%   BMI 34.78 kg/m    Body mass index is 34.78 kg/m .  Physical Exam   GENERAL: alert and no distress  RESP: lungs clear to auscultation - no rales, rhonchi or wheezes  CV: regular rate and rhythm, normal S1 S2, no S3 or S4, no murmur, click or rub, no peripheral edema  MS: no gross musculoskeletal defects noted, no edema    Results for orders placed or performed in visit on 11/06/24 (from the past 24 hours)   Wet prep - lab collect    Specimen: Vagina; Swab   Result Value Ref Range    Trichomonas Absent Absent    Yeast Absent Absent    Clue Cells Present (A) Absent    WBCs/high power field 3+ (A) None           Signed Electronically by: Connie Silva PA-C    "

## 2024-11-21 ENCOUNTER — MYC REFILL (OUTPATIENT)
Dept: FAMILY MEDICINE | Facility: CLINIC | Age: 34
End: 2024-11-21
Payer: COMMERCIAL

## 2024-11-21 DIAGNOSIS — N76.0 VAGINITIS AND VULVOVAGINITIS: Primary | ICD-10-CM

## 2024-11-21 DIAGNOSIS — N76.0 BACTERIAL VAGINOSIS: ICD-10-CM

## 2024-11-21 DIAGNOSIS — B96.89 BACTERIAL VAGINOSIS: ICD-10-CM

## 2024-11-21 RX ORDER — METRONIDAZOLE 500 MG/1
500 TABLET ORAL 2 TIMES DAILY
Qty: 14 TABLET | Refills: 0 | Status: CANCELLED | OUTPATIENT
Start: 2024-11-21

## 2024-11-21 NOTE — TELEPHONE ENCOUNTER
[facility] [facility] sent to patient for more information about this refill request.     Mariaelena DAWSON  [facility]

## 2024-11-25 ENCOUNTER — LAB (OUTPATIENT)
Dept: LAB | Facility: CLINIC | Age: 34
End: 2024-11-25
Payer: COMMERCIAL

## 2024-11-25 DIAGNOSIS — N76.0 VAGINITIS AND VULVOVAGINITIS: ICD-10-CM

## 2024-11-25 LAB
CLUE CELLS: ABNORMAL
TRICHOMONAS, WET PREP: ABNORMAL
WBC'S/HIGH POWER FIELD, WET PREP: ABNORMAL
YEAST, WET PREP: ABNORMAL

## 2024-11-25 PROCEDURE — 87210 SMEAR WET MOUNT SALINE/INK: CPT

## 2025-01-02 DIAGNOSIS — E66.9 OBESITY (BMI 30-39.9): ICD-10-CM

## 2025-01-02 RX ORDER — SEMAGLUTIDE 1.7 MG/.75ML
INJECTION, SOLUTION SUBCUTANEOUS
Qty: 4 ML | Refills: 0 | Status: SHIPPED | OUTPATIENT
Start: 2025-01-02

## 2025-01-03 ENCOUNTER — TELEPHONE (OUTPATIENT)
Dept: FAMILY MEDICINE | Facility: CLINIC | Age: 35
End: 2025-01-03
Payer: COMMERCIAL

## 2025-01-03 NOTE — TELEPHONE ENCOUNTER
PRIOR AUTHORIZATION DENIED    Medication: WEGOVY 1.7 MG/0.75ML SC SOAJ  Insurance Company: [employer] - Phone [phone] Fax [phone]  Denial Date: 1/3/2025  Denial Reason(s): Criteria not met for renewal    Appeal Information:   Patient Notified: No

## 2025-01-06 ENCOUNTER — VIRTUAL VISIT (OUTPATIENT)
Dept: FAMILY MEDICINE | Facility: CLINIC | Age: 35
End: 2025-01-06
Payer: COMMERCIAL

## 2025-01-06 DIAGNOSIS — F19.11 HISTORY OF SUBSTANCE ABUSE (H): ICD-10-CM

## 2025-01-06 DIAGNOSIS — E66.9 OBESITY (BMI 30-39.9): Primary | ICD-10-CM

## 2025-01-06 DIAGNOSIS — E03.9 ACQUIRED HYPOTHYROIDISM: ICD-10-CM

## 2025-01-06 DIAGNOSIS — F33.1 MODERATE RECURRENT MAJOR DEPRESSION (H): ICD-10-CM

## 2025-01-06 PROCEDURE — G2211 COMPLEX E/M VISIT ADD ON: HCPCS | Performed by: PHYSICIAN ASSISTANT

## 2025-01-06 PROCEDURE — 98006 SYNCH AUDIO-VIDEO EST MOD 30: CPT | Performed by: PHYSICIAN ASSISTANT

## 2025-01-06 RX ORDER — SEMAGLUTIDE 1.7 MG/.75ML
1.7 INJECTION, SOLUTION SUBCUTANEOUS
Qty: 4 ML | Refills: 5 | Status: SHIPPED | OUTPATIENT
Start: 2025-01-06

## 2025-01-06 NOTE — PROGRESS NOTES
Evita is a 34 year old who is being evaluated via a billable video visit.    How would you like to obtain your AVS? MyChart  If the video visit is dropped, the invitation should be resent by: Text to cell phone: [phone]  Will anyone else be joining your video visit? No      Assessment & Plan     Obesity (BMI 30-39.9)  Doing well with weight loss efforts and tolerating medication without any significant side effects.    1/6/25: 196 lbs  Wt Readings from Last 4 Encounters:   11/06/24 94.8 kg (209 lb)   08/09/24 93.9 kg (207 lb)   07/30/24 95.1 kg (209 lb 9.6 oz)   07/24/24 94.8 kg (209 lb)       Continues to work on lifestyle changes for weight loss including healthy diet and regular exercise.      Doing well with higher dose of Wegovy, this has been an effective weight loss tool for her.  I suggest continuing with current dose, may increase to 2.4 mg if needed down the road.      May consider referral to weight loss specialist to discuss alterative weight loss tools.    We discussed phentermine as another medication option, however given her h/o methamphetamine use, I would avoid a controlled substance.   May consider contrave.     - Semaglutide-Weight Management (WEGOVY) 1.7 MG/0.75ML pen; Inject 1.7 mg subcutaneously every 7 days.    History of substance abuse (H)  Has remained sober from Meth    Moderate recurrent major depression (H)  Last phq9 from June was stable. May benefit from the contrave if needed.     Hypothyroidism:  has labs to recheck thyroid levels next week and will refill medication accordingly.     The longitudinal plan of care for the diagnosis(es)/condition(s) as documented were addressed during this visit. Due to the added complexity in care, I will continue to support Evita in the subsequent management and with ongoing continuity of care.            Shin Jama is a 34 year old, presenting for the following health issues:  No chief complaint on file.        1/6/2025    10:02 AM  "  Additional Questions   Roomed by Jeanne   Accompanied by Self         1/6/2025    10:02 AM   Patient Reported Additional Medications   Patient reports taking the following new medications NA     History of Present Illness       Reason for visit:  Refill   She is taking medications regularly.     Patient with history of Hypothyroidism and Weight Management arrived for medication follow-up.     Hypothyroidism Follow-up    Since last visit, patient describes the following symptoms: Has had some weight loss, no hair loss, no skin changes, no constipation, no loose stools and hair loss    Medication Followup of Wegovy  Taking Medication as prescribed: yes  Side Effects:  None  Medication Helping Symptoms:  yes    Todays pt reported weight: 196lb  Last recorded weight 11/0624: 209lbs      Doing much better with the higher dose of wegovy (1.7 mg).  She has lost 13 lbs in just 1 month with the higher dose of Wegovy.  Has noticed a decrease in portion sizes.    No issues with side effects.      Exercise:  walks every day and will sometimes do a home cardiovascular step tape.     Diet: She has stopped eating noodles. Limiting her fast foods.  Working on a healthy well balanced diet.     She is frustrated with insurance not covering this medication, if this is still not covered despite showing effectiveness now, she is interested in considering bariatric surgery (possibly a band).         Review of Systems  Constitutional, neuro, ENT, endocrine, pulmonary, cardiac, gastrointestinal, genitourinary, musculoskeletal, integument and psychiatric systems are negative, except as otherwise noted.      Objective    Vitals - Patient Reported  Weight (Patient Reported): 88.9 kg (196 lb)  Height (Patient Reported): 165.1 cm (5' 5\")  BMI (Based on Pt Reported Ht/Wt): 32.62      Vitals:  No vitals were obtained today due to virtual visit.    Physical Exam   GENERAL: alert and no distress  EYES: Eyes grossly normal to inspection.  No " discharge or erythema, or obvious scleral/conjunctival abnormalities.  RESP: No audible wheeze, cough, or visible cyanosis.    SKIN: Visible skin clear. No significant rash, abnormal pigmentation or lesions.  NEURO: Cranial nerves grossly intact.  Mentation and speech appropriate for age.  PSYCH: Appropriate affect, tone, and pace of words          Video-Visit Details    Type of service:  Video Visit   Originating Location (pt. Location): Home    Distant Location (provider location):  On-site  Platform used for Video Visit: Sofy  Signed Electronically by: Connie Silva PA-C

## 2025-01-07 NOTE — TELEPHONE ENCOUNTER
MEDICATION APPEAL APPROVED    Medication: WEGOVY 1.7 MG/0.75ML SC SOAJ  Authorization Effective Date: 1/7/2025  Authorization Expiration Date: 1/7/2026  Approved Dose/Quantity: 3/28  Reference #:     Appeal Insurance Company: [employer]  Expected CoPay: $       CoPay Card Available:    Financial Assistance Needed:   Filling Pharmacy: [facility] [address] [place] [address] [id number]  Patient Notified: Yes- pharmacy will contact when ready  Comments:

## 2025-01-07 NOTE — TELEPHONE ENCOUNTER
Medication Appeal Initiation    Medication: WEGOVY 1.7 MG/0.75ML SC SOAJ  Appeal Start Date:  1/7/2025  Insurance Company: [employer] Phone:   Insurance Fax:   Comments:    DAPHNIE

## 2025-01-13 ENCOUNTER — LAB (OUTPATIENT)
Dept: LAB | Facility: CLINIC | Age: 35
End: 2025-01-13
Payer: COMMERCIAL

## 2025-01-13 DIAGNOSIS — E03.9 HYPOTHYROIDISM, UNSPECIFIED TYPE: ICD-10-CM

## 2025-01-13 PROCEDURE — 84443 ASSAY THYROID STIM HORMONE: CPT

## 2025-01-13 PROCEDURE — 36415 COLL VENOUS BLD VENIPUNCTURE: CPT

## 2025-01-14 DIAGNOSIS — E03.9 HYPOTHYROIDISM, UNSPECIFIED TYPE: Primary | ICD-10-CM

## 2025-01-14 LAB — TSH SERPL DL<=0.005 MIU/L-ACNC: 0.12 UIU/ML (ref 0.3–4.2)

## 2025-01-14 RX ORDER — LEVOTHYROXINE SODIUM 125 UG/1
125 TABLET ORAL
Qty: 90 TABLET | Refills: 0 | Status: SHIPPED | OUTPATIENT
Start: 2025-01-14

## 2025-01-22 ENCOUNTER — PATIENT OUTREACH (OUTPATIENT)
Dept: CARE COORDINATION | Facility: CLINIC | Age: 35
End: 2025-01-22
Payer: COMMERCIAL

## 2025-02-03 ENCOUNTER — MYC MEDICAL ADVICE (OUTPATIENT)
Dept: FAMILY MEDICINE | Facility: CLINIC | Age: 35
End: 2025-02-03
Payer: COMMERCIAL

## 2025-02-03 NOTE — TELEPHONE ENCOUNTER
Please schedule an office visit with me for an IUD recheck/birth control discussion (needs to be in person).     Ok to use provider approval or same day    Connie Silva PA-C

## 2025-02-03 NOTE — TELEPHONE ENCOUNTER
Do you want patient to schedule virtual or in person to further discuss this? Unsure if would recommend doing anything with IUD and wanting in person.       Kathy Moreno RN on 2/3/2025 at 11:48 AM

## 2025-02-04 ASSESSMENT — PATIENT HEALTH QUESTIONNAIRE - PHQ9
SUM OF ALL RESPONSES TO PHQ QUESTIONS 1-9: 0
10. IF YOU CHECKED OFF ANY PROBLEMS, HOW DIFFICULT HAVE THESE PROBLEMS MADE IT FOR YOU TO DO YOUR WORK, TAKE CARE OF THINGS AT HOME, OR GET ALONG WITH OTHER PEOPLE: NOT DIFFICULT AT ALL
SUM OF ALL RESPONSES TO PHQ QUESTIONS 1-9: 0

## 2025-02-05 ENCOUNTER — PATIENT OUTREACH (OUTPATIENT)
Dept: CARE COORDINATION | Facility: CLINIC | Age: 35
End: 2025-02-05

## 2025-02-05 ENCOUNTER — OFFICE VISIT (OUTPATIENT)
Dept: FAMILY MEDICINE | Facility: CLINIC | Age: 35
End: 2025-02-05
Payer: COMMERCIAL

## 2025-02-05 VITALS
BODY MASS INDEX: 32.49 KG/M2 | WEIGHT: 195 LBS | HEART RATE: 60 BPM | SYSTOLIC BLOOD PRESSURE: 116 MMHG | TEMPERATURE: 98.3 F | HEIGHT: 65 IN | DIASTOLIC BLOOD PRESSURE: 68 MMHG | RESPIRATION RATE: 16 BRPM | OXYGEN SATURATION: 100 %

## 2025-02-05 DIAGNOSIS — Z30.431 IUD CHECK UP: ICD-10-CM

## 2025-02-05 DIAGNOSIS — E66.9 OBESITY (BMI 30-39.9): ICD-10-CM

## 2025-02-05 DIAGNOSIS — R30.0 DYSURIA: ICD-10-CM

## 2025-02-05 DIAGNOSIS — N30.01 ACUTE CYSTITIS WITH HEMATURIA: Primary | ICD-10-CM

## 2025-02-05 DIAGNOSIS — L85.8 KERATOSIS PILARIS: ICD-10-CM

## 2025-02-05 LAB
ALBUMIN UR-MCNC: 30 MG/DL
APPEARANCE UR: ABNORMAL
BACTERIA #/AREA URNS HPF: ABNORMAL /HPF
BILIRUB UR QL STRIP: NEGATIVE
COLOR UR AUTO: YELLOW
GLUCOSE UR STRIP-MCNC: NEGATIVE MG/DL
HGB UR QL STRIP: ABNORMAL
KETONES UR STRIP-MCNC: NEGATIVE MG/DL
LEUKOCYTE ESTERASE UR QL STRIP: ABNORMAL
MUCOUS THREADS #/AREA URNS LPF: PRESENT /LPF
NITRATE UR QL: NEGATIVE
PH UR STRIP: 6.5 [PH] (ref 5–7)
RBC #/AREA URNS AUTO: ABNORMAL /HPF
SP GR UR STRIP: 1.02 (ref 1–1.03)
SQUAMOUS #/AREA URNS AUTO: ABNORMAL /LPF
UROBILINOGEN UR STRIP-ACNC: 0.2 E.U./DL
WBC #/AREA URNS AUTO: ABNORMAL /HPF
WBC CLUMPS #/AREA URNS HPF: PRESENT /HPF

## 2025-02-05 PROCEDURE — 81001 URINALYSIS AUTO W/SCOPE: CPT | Performed by: PHYSICIAN ASSISTANT

## 2025-02-05 PROCEDURE — 99214 OFFICE O/P EST MOD 30 MIN: CPT | Performed by: PHYSICIAN ASSISTANT

## 2025-02-05 RX ORDER — SULFAMETHOXAZOLE AND TRIMETHOPRIM 800; 160 MG/1; MG/1
1 TABLET ORAL 2 TIMES DAILY
Qty: 6 TABLET | Refills: 0 | Status: SHIPPED | OUTPATIENT
Start: 2025-02-05 | End: 2025-02-08

## 2025-02-05 NOTE — PATIENT INSTRUCTIONS
skin care measures aimed at preventing excessive skin dryness, including using mild soaps or soap-free cleansers and avoiding hot baths or showers.  ?Emollients and keratolytics - Emollients and topical keratolytics are the first-line therapy for KP. Preparations containing lactic acid, salicylic acid, or topical urea are helpful in softening and flattening the keratotic ??p?le?, but do not reduce or relieve the associated erythema [1,29].

## 2025-02-05 NOTE — PROGRESS NOTES
Assessment & Plan     Acute cystitis with hematuria  UTI uncomplicated without evidence of pyelonephritis    Urine analysis noted and in chart.   Treatment per orders -  patient to increase fluids/cranberry juice. Call or return to clinic prn if these symptoms worsen or fail to improve as anticipated.    - sulfamethoxazole-trimethoprim (BACTRIM DS) 800-160 MG tablet; Take 1 tablet by mouth 2 times daily for 3 days.    IUD check up  IUD string visualized and in good positioning.  String is already cut short (approximately 5 mm noted), therefore further trimming is not recommended at this time.     Dysuria    - UA Macroscopic with reflex to Microscopic and Culture - Clinic Collect  - Wet prep - Clinic Collect; Future  - Urine Microscopic Exam  - Urine Culture    Obesity (BMI 30-39.9)  Doing well with weight loss efforts and tolerating medication without any significant side effects.    Wt Readings from Last 4 Encounters:   02/05/25 88.5 kg (195 lb)   11/06/24 94.8 kg (209 lb)   08/09/24 93.9 kg (207 lb)   07/30/24 95.1 kg (209 lb 9.6 oz)       Continues to work on lifestyle changes for weight loss including healthy diet and regular exercise.      Will increase dose.   - Semaglutide-Weight Management (WEGOVY) 2.4 MG/0.75ML pen; Inject 2.4 mg subcutaneously once a week.    Keratosis pilaris     skin care measures aimed at preventing excessive skin dryness, including using mild soaps or soap-free cleansers and avoiding hot baths or showers.  ?Emollients and keratolytics - Emollients and topical keratolytics are the first-line therapy for KP. Preparations containing lactic acid, salicylic acid, or topical urea are helpful in softening and flattening the keratotic ??p?le?, but do not reduce or relieve the associated erythema [1,29].              Shin Jama is a 34 year old, presenting for the following health issues:  UTI        2/5/2025     1:46 PM   Additional Questions   Roomed by Jeanne   Accompanied by Self  "        2/5/2025     1:46 PM   Patient Reported Additional Medications   Patient reports taking the following new medications Unlabeled medication for past UTI     History of Present Illness       Reason for visit:  Check up   She is taking medications regularly.     Patient arrived to discuss possible UTI that started this morning.    UA collected upon arrival, waiting for results.     Genitourinary - Female  Onset/Duration: this morning   Description:   Painful urination (Dysuria): No           Frequency: YES  Blood in urine (Hematuria): No  Delay in urine (Hesitency): No  Intensity: mild  Progression of Symptoms:  same  Accompanying Signs & Symptoms:  Fever/chills: No  Flank pain: No  Nausea and vomiting: No  Vaginal symptoms: none  Abdominal/Pelvic Pain: No  History:   History of frequent UTI s: YES  History of kidney stones: No  Sexually Active: YES  Possibility of pregnancy: No  Precipitating or alleviating factors: None  Therapies tried and outcome: Pt took unable Medication that previously prescribed for past UTI    Hit a weight loss plateau.  Tolerating 1.7 mg wegovy, no issues.     Also with a rash on both legs.  Does not itch.            Review of Systems  Constitutional, HEENT, cardiovascular, pulmonary, gi and gu systems are negative, except as otherwise noted.      Objective    /68 (BP Location: Right arm, Patient Position: Chair, Cuff Size: Adult Regular)   Pulse 60   Temp 98.3  F (36.8  C) (Tympanic)   Resp 16   Ht 1.655 m (5' 5.16\")   Wt 88.5 kg (195 lb)   SpO2 100%   BMI 32.29 kg/m    Body mass index is 32.29 kg/m .  Physical Exam   GENERAL: alert and no distress  ABDOMEN: soft, nontender, no hepatosplenomegaly, no masses and bowel sounds normal, no CVA tenderness.    (female): normal female external genitalia, normal urethral meatus, normal vaginal mucosa, IUD string visualized at cervical os (approximately 5 mm).   MS: no gross musculoskeletal defects noted, no edema  Skin: " generalized erythematous papular rash noted on both legs.    Results for orders placed or performed in visit on 02/05/25 (from the past 24 hours)   UA Macroscopic with reflex to Microscopic and Culture - Clinic Collect    Specimen: Urine, Clean Catch   Result Value Ref Range    Color Urine Yellow Colorless, Straw, Light Yellow, Yellow    Appearance Urine Slightly Cloudy (A) Clear    Glucose Urine Negative Negative mg/dL    Bilirubin Urine Negative Negative    Ketones Urine Negative Negative mg/dL    Specific Gravity Urine 1.025 1.003 - 1.035    Blood Urine Moderate (A) Negative    pH Urine 6.5 5.0 - 7.0    Protein Albumin Urine 30 (A) Negative mg/dL    Urobilinogen Urine 0.2 0.2, 1.0 E.U./dL    Nitrite Urine Negative Negative    Leukocyte Esterase Urine Large (A) Negative   Urine Microscopic Exam   Result Value Ref Range    Bacteria Urine Few (A) None Seen /HPF    RBC Urine 10-25 (A) 0-2 /HPF /HPF    WBC Urine  (A) 0-5 /HPF /HPF    Squamous Epithelials Urine Few (A) None Seen /LPF    WBC Clumps Urine Present (A) None Seen /HPF    Mucus Urine Present (A) None Seen /LPF           Signed Electronically by: Connie Silva PA-C

## 2025-02-24 ENCOUNTER — MYC MEDICAL ADVICE (OUTPATIENT)
Dept: FAMILY MEDICINE | Facility: CLINIC | Age: 35
End: 2025-02-24
Payer: COMMERCIAL

## 2025-02-24 DIAGNOSIS — R30.0 DYSURIA: Primary | ICD-10-CM

## 2025-02-26 ENCOUNTER — LAB (OUTPATIENT)
Dept: LAB | Facility: OTHER | Age: 35
End: 2025-02-26
Payer: COMMERCIAL

## 2025-02-26 DIAGNOSIS — R30.0 DYSURIA: ICD-10-CM

## 2025-02-26 LAB
ALBUMIN UR-MCNC: NEGATIVE MG/DL
APPEARANCE UR: CLEAR
BILIRUB UR QL STRIP: NEGATIVE
CLUE CELLS: NORMAL
COLOR UR AUTO: YELLOW
GLUCOSE UR STRIP-MCNC: NEGATIVE MG/DL
HGB UR QL STRIP: NEGATIVE
KETONES UR STRIP-MCNC: NEGATIVE MG/DL
LEUKOCYTE ESTERASE UR QL STRIP: NEGATIVE
NITRATE UR QL: NEGATIVE
PH UR STRIP: 7 [PH] (ref 5–7)
SP GR UR STRIP: 1.02 (ref 1–1.03)
TRICHOMONAS, WET PREP: NORMAL
UROBILINOGEN UR STRIP-ACNC: 0.2 E.U./DL
WBC'S/HIGH POWER FIELD, WET PREP: NORMAL
YEAST, WET PREP: NORMAL

## 2025-02-26 PROCEDURE — 87210 SMEAR WET MOUNT SALINE/INK: CPT

## 2025-02-26 PROCEDURE — 81003 URINALYSIS AUTO W/O SCOPE: CPT

## 2025-03-05 ENCOUNTER — OFFICE VISIT (OUTPATIENT)
Dept: FAMILY MEDICINE | Facility: CLINIC | Age: 35
End: 2025-03-05
Payer: COMMERCIAL

## 2025-03-05 VITALS
RESPIRATION RATE: 16 BRPM | SYSTOLIC BLOOD PRESSURE: 108 MMHG | BODY MASS INDEX: 31.8 KG/M2 | TEMPERATURE: 97.6 F | HEART RATE: 71 BPM | OXYGEN SATURATION: 100 % | WEIGHT: 192 LBS | DIASTOLIC BLOOD PRESSURE: 68 MMHG

## 2025-03-05 DIAGNOSIS — F31.62 BIPOLAR DISORDER, CURRENT EPISODE MIXED, MODERATE (H): ICD-10-CM

## 2025-03-05 DIAGNOSIS — N89.8 VAGINAL DISCHARGE: ICD-10-CM

## 2025-03-05 DIAGNOSIS — Z11.3 ROUTINE SCREENING FOR STI (SEXUALLY TRANSMITTED INFECTION): ICD-10-CM

## 2025-03-05 DIAGNOSIS — R35.0 URINARY FREQUENCY: Primary | ICD-10-CM

## 2025-03-05 LAB
ALBUMIN UR-MCNC: ABNORMAL MG/DL
APPEARANCE UR: ABNORMAL
BACTERIA #/AREA URNS HPF: ABNORMAL /HPF
BILIRUB UR QL STRIP: NEGATIVE
CLUE CELLS: ABNORMAL
COLOR UR AUTO: YELLOW
GLUCOSE UR STRIP-MCNC: NEGATIVE MG/DL
HGB UR QL STRIP: ABNORMAL
KETONES UR STRIP-MCNC: 40 MG/DL
LEUKOCYTE ESTERASE UR QL STRIP: ABNORMAL
MUCOUS THREADS #/AREA URNS LPF: PRESENT /LPF
NITRATE UR QL: NEGATIVE
PH UR STRIP: 7 [PH] (ref 5–7)
RBC #/AREA URNS AUTO: ABNORMAL /HPF
SP GR UR STRIP: 1.02 (ref 1–1.03)
SQUAMOUS #/AREA URNS AUTO: ABNORMAL /LPF
TRICHOMONAS, WET PREP: ABNORMAL
UROBILINOGEN UR STRIP-ACNC: 0.2 E.U./DL
WBC #/AREA URNS AUTO: ABNORMAL /HPF
WBC'S/HIGH POWER FIELD, WET PREP: ABNORMAL
YEAST, WET PREP: ABNORMAL

## 2025-03-05 PROCEDURE — 87210 SMEAR WET MOUNT SALINE/INK: CPT | Performed by: PHYSICIAN ASSISTANT

## 2025-03-05 PROCEDURE — 3074F SYST BP LT 130 MM HG: CPT | Performed by: PHYSICIAN ASSISTANT

## 2025-03-05 PROCEDURE — 81001 URINALYSIS AUTO W/SCOPE: CPT | Performed by: PHYSICIAN ASSISTANT

## 2025-03-05 PROCEDURE — 87086 URINE CULTURE/COLONY COUNT: CPT | Performed by: PHYSICIAN ASSISTANT

## 2025-03-05 PROCEDURE — 3078F DIAST BP <80 MM HG: CPT | Performed by: PHYSICIAN ASSISTANT

## 2025-03-05 PROCEDURE — 87491 CHLMYD TRACH DNA AMP PROBE: CPT | Performed by: PHYSICIAN ASSISTANT

## 2025-03-05 PROCEDURE — 99213 OFFICE O/P EST LOW 20 MIN: CPT | Performed by: PHYSICIAN ASSISTANT

## 2025-03-05 PROCEDURE — 87591 N.GONORRHOEAE DNA AMP PROB: CPT | Performed by: PHYSICIAN ASSISTANT

## 2025-03-05 RX ORDER — SULFAMETHOXAZOLE AND TRIMETHOPRIM 800; 160 MG/1; MG/1
1 TABLET ORAL 2 TIMES DAILY
Qty: 14 TABLET | Refills: 0 | Status: SHIPPED | OUTPATIENT
Start: 2025-03-05 | End: 2025-03-12

## 2025-03-05 NOTE — PROGRESS NOTES
Assessment & Plan     Urinary frequency  Likely due to the new vaginal gummy supplement started.  She will stop this.  Will treat with another round of antibiotics (of note, last culture came back contaminated).   Symptoms woke her up from sleep today.  - UA Macroscopic with reflex to Microscopic and Culture - Lab Collect; Future  - UA Macroscopic with reflex to Microscopic and Culture - Lab Collect  - Urine Microscopic Exam  - Urine Culture  - sulfamethoxazole-trimethoprim (BACTRIM DS) 800-160 MG tablet; Take 1 tablet by mouth 2 times daily for 7 days.    Vaginal discharge  Will stop the supplement.   - Wet prep - lab collect; Future  - Wet prep - lab collect    Routine screening for STI (sexually transmitted infection)  Due to recheck.   - Chlamydia trachomatis PCR  - Neisseria gonorrhoeae PCR    Bipolar disorder, current episode mixed, moderate (H)  Mood has been stable.                 Shin Jama is a 35 year old, presenting for the following health issues:  UTI        3/5/2025    10:08 AM   Additional Questions   Roomed by Jeanne   Accompanied by Self         3/5/2025    10:08 AM   Patient Reported Additional Medications   Patient reports taking the following new medications took SMZ/T/mp -160 tab this morning     UTI    History of Present Illness       Reason for visit:  Uti    She eats 4 or more servings of fruits and vegetables daily.She consumes 2 sweetened beverage(s) daily.She exercises with enough effort to increase her heart rate 30 to 60 minutes per day.  She exercises with enough effort to increase her heart rate 3 or less days per week.   She is taking medications regularly.      Patient arrived to discuss symptoms of possible UTI.     Genitourinary - Female  Onset/Duration: this morning   Description:   Painful urination (Dysuria): No           Frequency: YES  Blood in urine (Hematuria): No  Delay in urine (Hesitency): No  Intensity: mild  Progression of Symptoms:  same  Accompanying  Signs & Symptoms:  Fever/chills: No  Flank pain: No  Nausea and vomiting: No  Vaginal symptoms: discharge  Abdominal/Pelvic Pain: No  History:   History of frequent UTI s: YES  History of kidney stones: No  Sexually Active: YES  Possibility of pregnancy: No - Pt has Mirena IUD   Precipitating or alleviating factors: None  Therapies tried and outcome: SMZ/T/mp -160 tab this morning     *Wet Prep and UA collected upon arrival.     She did start a new vaginal gummy supplement (about 1 month ago) and since then she has noticed more discharge and urinary symptoms.    She states the supplement is supposed to make the vagina taste like pineapple.           Review of Systems  Constitutional, HEENT, cardiovascular, pulmonary, gi and gu systems are negative, except as otherwise noted.      Objective    There were no vitals taken for this visit.  There is no height or weight on file to calculate BMI.  Physical Exam   GENERAL: alert and no distress    Results for orders placed or performed in visit on 03/05/25 (from the past 24 hours)   Wet prep - lab collect    Specimen: Vagina; Swab   Result Value Ref Range    Trichomonas Absent Absent    Yeast Absent Absent    Clue Cells Absent Absent    WBCs/high power field 3+ (A) None   UA Macroscopic with reflex to Microscopic and Culture - Lab Collect    Specimen: Urine, Clean Catch   Result Value Ref Range    Color Urine Yellow Colorless, Straw, Light Yellow, Yellow    Appearance Urine Slightly Cloudy (A) Clear    Glucose Urine Negative Negative mg/dL    Bilirubin Urine Negative Negative    Ketones Urine 40 (A) Negative mg/dL    Specific Gravity Urine 1.025 1.003 - 1.035    Blood Urine Trace (A) Negative    pH Urine 7.0 5.0 - 7.0    Protein Albumin Urine Trace (A) Negative mg/dL    Urobilinogen Urine 0.2 0.2, 1.0 E.U./dL    Nitrite Urine Negative Negative    Leukocyte Esterase Urine Moderate (A) Negative   Urine Microscopic Exam   Result Value Ref Range    Bacteria Urine Moderate  (A) None Seen /HPF    RBC Urine 2-5 (A) 0-2 /HPF /HPF    WBC Urine  (A) 0-5 /HPF /HPF    Squamous Epithelials Urine Moderate (A) None Seen /LPF    Mucus Urine Present (A) None Seen /LPF           Signed Electronically by: Connie Silva PA-C

## 2025-03-06 LAB
BACTERIA UR CULT: NORMAL
C TRACH DNA SPEC QL NAA+PROBE: NEGATIVE
N GONORRHOEA DNA SPEC QL NAA+PROBE: NEGATIVE
SPECIMEN TYPE: NORMAL
SPECIMEN TYPE: NORMAL

## 2025-03-29 ENCOUNTER — HEALTH MAINTENANCE LETTER (OUTPATIENT)
Age: 35
End: 2025-03-29

## 2025-04-14 ENCOUNTER — MYC REFILL (OUTPATIENT)
Dept: FAMILY MEDICINE | Facility: CLINIC | Age: 35
End: 2025-04-14
Payer: COMMERCIAL

## 2025-04-14 DIAGNOSIS — E66.9 OBESITY (BMI 30-39.9): ICD-10-CM

## 2025-04-28 ENCOUNTER — OFFICE VISIT (OUTPATIENT)
Dept: FAMILY MEDICINE | Facility: CLINIC | Age: 35
End: 2025-04-28
Payer: COMMERCIAL

## 2025-04-28 VITALS
BODY MASS INDEX: 30.66 KG/M2 | DIASTOLIC BLOOD PRESSURE: 65 MMHG | WEIGHT: 184 LBS | TEMPERATURE: 97 F | OXYGEN SATURATION: 98 % | HEIGHT: 65 IN | HEART RATE: 68 BPM | SYSTOLIC BLOOD PRESSURE: 98 MMHG | RESPIRATION RATE: 16 BRPM

## 2025-04-28 DIAGNOSIS — Z13.220 SCREENING FOR HYPERLIPIDEMIA: ICD-10-CM

## 2025-04-28 DIAGNOSIS — E03.9 ACQUIRED HYPOTHYROIDISM: ICD-10-CM

## 2025-04-28 DIAGNOSIS — Z00.00 ROUTINE GENERAL MEDICAL EXAMINATION AT A HEALTH CARE FACILITY: Primary | ICD-10-CM

## 2025-04-28 DIAGNOSIS — E66.811 CLASS 1 OBESITY DUE TO EXCESS CALORIES WITHOUT SERIOUS COMORBIDITY WITH BODY MASS INDEX (BMI) OF 30.0 TO 30.9 IN ADULT: ICD-10-CM

## 2025-04-28 DIAGNOSIS — Z13.1 SCREENING FOR DIABETES MELLITUS: ICD-10-CM

## 2025-04-28 DIAGNOSIS — E66.09 CLASS 1 OBESITY DUE TO EXCESS CALORIES WITHOUT SERIOUS COMORBIDITY WITH BODY MASS INDEX (BMI) OF 30.0 TO 30.9 IN ADULT: ICD-10-CM

## 2025-04-28 LAB
EST. AVERAGE GLUCOSE BLD GHB EST-MCNC: 100 MG/DL
HBA1C MFR BLD: 5.1 % (ref 0–5.6)

## 2025-04-28 PROCEDURE — 36415 COLL VENOUS BLD VENIPUNCTURE: CPT | Performed by: INTERNAL MEDICINE

## 2025-04-28 PROCEDURE — 84443 ASSAY THYROID STIM HORMONE: CPT | Performed by: INTERNAL MEDICINE

## 2025-04-28 PROCEDURE — 83036 HEMOGLOBIN GLYCOSYLATED A1C: CPT | Performed by: INTERNAL MEDICINE

## 2025-04-28 PROCEDURE — 99395 PREV VISIT EST AGE 18-39: CPT | Performed by: INTERNAL MEDICINE

## 2025-04-28 PROCEDURE — 1126F AMNT PAIN NOTED NONE PRSNT: CPT | Performed by: INTERNAL MEDICINE

## 2025-04-28 PROCEDURE — 84439 ASSAY OF FREE THYROXINE: CPT | Performed by: INTERNAL MEDICINE

## 2025-04-28 PROCEDURE — 80061 LIPID PANEL: CPT | Performed by: INTERNAL MEDICINE

## 2025-04-28 PROCEDURE — 3074F SYST BP LT 130 MM HG: CPT | Performed by: INTERNAL MEDICINE

## 2025-04-28 PROCEDURE — 99214 OFFICE O/P EST MOD 30 MIN: CPT | Mod: 25 | Performed by: INTERNAL MEDICINE

## 2025-04-28 PROCEDURE — 3078F DIAST BP <80 MM HG: CPT | Performed by: INTERNAL MEDICINE

## 2025-04-28 SDOH — HEALTH STABILITY: PHYSICAL HEALTH: ON AVERAGE, HOW MANY DAYS PER WEEK DO YOU ENGAGE IN MODERATE TO STRENUOUS EXERCISE (LIKE A BRISK WALK)?: 7 DAYS

## 2025-04-28 ASSESSMENT — ANXIETY QUESTIONNAIRES
7. FEELING AFRAID AS IF SOMETHING AWFUL MIGHT HAPPEN: NOT AT ALL
1. FEELING NERVOUS, ANXIOUS, OR ON EDGE: NOT AT ALL
3. WORRYING TOO MUCH ABOUT DIFFERENT THINGS: NOT AT ALL
5. BEING SO RESTLESS THAT IT IS HARD TO SIT STILL: NOT AT ALL
7. FEELING AFRAID AS IF SOMETHING AWFUL MIGHT HAPPEN: NOT AT ALL
GAD7 TOTAL SCORE: 0
IF YOU CHECKED OFF ANY PROBLEMS ON THIS QUESTIONNAIRE, HOW DIFFICULT HAVE THESE PROBLEMS MADE IT FOR YOU TO DO YOUR WORK, TAKE CARE OF THINGS AT HOME, OR GET ALONG WITH OTHER PEOPLE: NOT DIFFICULT AT ALL
8. IF YOU CHECKED OFF ANY PROBLEMS, HOW DIFFICULT HAVE THESE MADE IT FOR YOU TO DO YOUR WORK, TAKE CARE OF THINGS AT HOME, OR GET ALONG WITH OTHER PEOPLE?: NOT DIFFICULT AT ALL
2. NOT BEING ABLE TO STOP OR CONTROL WORRYING: NOT AT ALL
GAD7 TOTAL SCORE: 0
4. TROUBLE RELAXING: NOT AT ALL
6. BECOMING EASILY ANNOYED OR IRRITABLE: NOT AT ALL
GAD7 TOTAL SCORE: 0

## 2025-04-28 ASSESSMENT — SOCIAL DETERMINANTS OF HEALTH (SDOH): HOW OFTEN DO YOU GET TOGETHER WITH FRIENDS OR RELATIVES?: MORE THAN THREE TIMES A WEEK

## 2025-04-28 ASSESSMENT — PAIN SCALES - GENERAL: PAINLEVEL_OUTOF10: NO PAIN (0)

## 2025-04-28 NOTE — PROGRESS NOTES
"Preventive Care Visit  [facility]  Manuel Castro MD, Internal Medicine  Apr 28, 2025      Assessment & Plan     (Z00.00) Routine general medical examination at a health care facility  (primary encounter diagnosis)  Comment: Routine preventative physical.  No specific concerns.  Plan:      (E03.9) Acquired hypothyroidism  Comment: History of hypothyroidism.  Her dosing has been somewhat variable given her weight loss  Plan: TSH with free T4 reflex             (Z13.1) Screening for diabetes mellitus  Comment: Noted  Plan: Hemoglobin A1c             (Z13.220) Screening for hyperlipidemia  Comment: Previous cholesterols have shown good control.  LDL has increased slightly in the past year  Plan: Lipid panel             (E66.811,  E66.09,  Z68.30) Class 1 obesity due to excess calories without serious comorbidity with body mass index (BMI) of 30.0 to 30.9 in adult  Comment: Noted.  Treated with Wegovy.  She has been on Wegovy for 7 months.  She has lost 20 pounds.  Plan: Continue Wegovy            BMI  Estimated body mass index is 30.62 kg/m  as calculated from the following:    Height as of this encounter: 1.651 m (5' 5\").    Weight as of this encounter: 83.5 kg (184 lb).   Weight management plan: On Wegovy 2.4 mg weekly    Counseling  Appropriate preventive services were addressed with this patient via screening, questionnaire, or discussion as appropriate for fall prevention, nutrition, physical activity, Tobacco-use cessation, social engagement, weight loss and cognition.  Checklist reviewing preventive services available has been given to the patient.  Reviewed patient's diet, addressing concerns and/or questions.           Shin Jama is a 35 year old, presenting for the following:  Physical        4/28/2025     5:13 PM   Additional Questions   Roomed by Kaylyn HARRIS   Accompanied by Self          HPI  35-year-old female presents for preventative physical.  She does not have any " specific concerns.  She has a history of chemical dependency which she has overcome.  She has been drug-free for over 3 years.  She has a history of bipolar disorder anxiety and depression that have been well-controlled.  She is currently not on any medications.  She has a history of hypothyroidism and needs to have her thyroid levels checked.  She has a history of obesity and is on Wegovy 2.4 mg weekly.  She is having good weight loss.    Advance Care Planning            4/28/2025   General Health   How would you rate your overall physical health? Good   Feel stress (tense, anxious, or unable to sleep) Not at all         4/28/2025   Nutrition   Three or more servings of calcium each day? Yes   Diet: Breakfast skipped   How many servings of fruit and vegetables per day? (!) 0-1   How many sweetened beverages each day? 0-1         4/28/2025   Exercise   Days per week of moderate/strenous exercise 7 days         4/28/2025   Social Factors   Frequency of gathering with friends or relatives More than three times a week   Worry food won't last until get money to buy more No   Food not last or not have enough money for food? No   Do you have housing? (Housing is defined as stable permanent housing and does not include staying outside in a car, in a tent, in an abandoned building, in an overnight shelter, or couch-surfing.) Yes   Are you worried about losing your housing? No   Lack of transportation? No   Unable to get utilities (heat,electricity)? No         4/28/2025   Dental   Dentist two times every year? Yes         Today's PHQ-9 Score:       2/4/2025     2:22 PM   PHQ-9 SCORE   PHQ-9 Total Score MyChart 0   PHQ-9 Total Score 0        Patient-reported           4/28/2025   Substance Use   Alcohol more than 3/day or more than 7/wk No   Do you use any other substances recreationally? No     Social History     Tobacco Use    Smoking status: Every Day     Current packs/day: 0.00     Average packs/day: 0.5 packs/day for  15.0 years (7.5 ttl pk-yrs)     Types: Cigarettes     Last attempt to quit: 2006     Years since quittin.3     Passive exposure: Past    Smokeless tobacco: Never    Tobacco comments:     1/2 PPD   Vaping Use    Vaping status: Never Used   Substance Use Topics    Alcohol use: No    Drug use: Yes     Types: Methamphetamines     Comment: smoking meth, last use 3/16/20          Mammogram Screening - Mammogram every 1-2 years updated in Health Maintenance based on mutual decision making        2025   STI Screening   New sexual partner(s) since last STI/HIV test? No     History of abnormal Pap smear: No - age 30-64 HPV with reflex Pap every 5 years recommended        Latest Ref Rng & Units 2024     8:32 AM 3/1/2017     3:25 PM 2014    12:00 AM   PAP / HPV   PAP  Negative for Intraepithelial Lesion or Malignancy (NILM)      PAP (Historical)   NIL  NIL    HPV 16 DNA Negative Negative      HPV 18 DNA Negative Negative      Other HR HPV Negative Negative              2025   Contraception/Family Planning   Questions about contraception or family planning No        Reviewed and updated as needed this visit by Provider                    Past Medical History:   Diagnosis Date    GERD (gastroesophageal reflux disease) 2011    Ingrowing nail     resection x2 left great toe     Moderate major depression (H) 2009    Pain     Pain     Papanicolaou smear of cervix with low grade squamous intraepithelial lesion (LGSIL) 2010    Thyroid disease      Past Surgical History:   Procedure Laterality Date    [facility] FOOT/TOES SURGERY PROC UNLISTED       Current Outpatient Medications   Medication Sig Dispense Refill    cyclobenzaprine (FLEXERIL) 10 MG tablet Take 1 tablet (10 mg) by mouth 3 times daily as needed for muscle spasms 30 tablet 0    levonorgestrel (MIRENA) 52 MG (20 mcg/day) IUD 1 each by Intrauterine route daily      levothyroxine (SYNTHROID/LEVOTHROID) 125 MCG tablet Take 1 tablet (125  "mcg) by mouth every morning (before breakfast). Due to recheck labs. 90 tablet 0    Semaglutide-Weight Management (WEGOVY) 2.4 MG/0.75ML pen Inject 2.4 mg subcutaneously once a week. 3 mL 1     No Known Allergies      Review of Systems  Constitutional, HEENT, cardiovascular, pulmonary, GI, , musculoskeletal, neuro, skin, endocrine and psych systems are negative, except as otherwise noted.     Objective    Exam  There were no vitals taken for this visit.   Estimated body mass index is 31.8 kg/m  as calculated from the following:    Height as of 2/5/25: 1.655 m (5' 5.16\").    Weight as of 3/5/25: 87.1 kg (192 lb).    Physical Exam  GENERAL: alert and no distress  EYES: Eyes grossly normal to inspection, PERRL and conjunctivae and sclerae normal  HENT: ear canals and TM's normal, nose and mouth without ulcers or lesions  NECK: no adenopathy, no asymmetry, masses, or scars  RESP: lungs clear to auscultation - no rales, rhonchi or wheezes  CV: regular rate and rhythm, normal S1 S2, no S3 or S4, no murmur, click or rub, no peripheral edema  ABDOMEN: soft, nontender, no hepatosplenomegaly, no masses and bowel sounds normal  MS: no gross musculoskeletal defects noted, no edema  SKIN: no suspicious lesions or rashes  NEURO: Normal strength and tone, mentation intact and speech normal  PSYCH: mentation appears normal, affect normal/bright        Signed Electronically by: Manuel Castro MD    "

## 2025-04-28 NOTE — PATIENT INSTRUCTIONS
Patient Education   Preventive Care Advice   This is general advice given by our system to help you stay healthy. However, your care team may have specific advice just for you. Please talk to your care team about your preventive care needs.  Nutrition  Eat 5 or more servings of fruits and vegetables each day.  Try wheat bread, brown rice and whole grain pasta (instead of white bread, rice, and pasta).  Get enough calcium and vitamin D. Check the label on foods and aim for 100% of the RDA (recommended daily allowance).  Lifestyle  Exercise at least 150 minutes each week  (30 minutes a day, 5 days a week).  Do muscle strengthening activities 2 days a week. These help control your weight and prevent disease.  No smoking.  Wear sunscreen to prevent skin cancer.  Have a dental exam and cleaning every 6 months.  Yearly exams  See your health care team every year to talk about:  Any changes in your health.  Any medicines your care team has prescribed.  Preventive care, family planning, and ways to prevent chronic diseases.  Shots (vaccines)   HPV shots (up to age 26), if you've never had them before.  Hepatitis B shots (up to age 59), if you've never had them before.  COVID-19 shot: Get this shot when it's due.  Flu shot: Get a flu shot every year.  Tetanus shot: Get a tetanus shot every 10 years.  Pneumococcal, hepatitis A, and RSV shots: Ask your care team if you need these based on your risk.  Shingles shot (for age 50 and up)  General health tests  Diabetes screening:  Starting at age 35, Get screened for diabetes at least every 3 years.  If you are younger than age 35, ask your care team if you should be screened for diabetes.  Cholesterol test: At age 39, start having a cholesterol test every 5 years, or more often if advised.  Bone density scan (DEXA): At age 50, ask your care team if you should have this scan for osteoporosis (brittle bones).  Hepatitis C: Get tested at least once in your life.  STIs (sexually  transmitted infections)  Before age 24: Ask your care team if you should be screened for STIs.  After age 24: Get screened for STIs if you're at risk. You are at risk for STIs (including HIV) if:  You are sexually active with more than one person.  You don't use condoms every time.  You or a partner was diagnosed with a sexually transmitted infection.  If you are at risk for HIV, ask about PrEP medicine to prevent HIV.  Get tested for HIV at least once in your life, whether you are at risk for HIV or not.  Cancer screening tests  Cervical cancer screening: If you have a cervix, begin getting regular cervical cancer screening tests starting at age 21.  Breast cancer scan (mammogram): If you've ever had breasts, begin having regular mammograms starting at age 40. This is a scan to check for breast cancer.  Colon cancer screening: It is important to start screening for colon cancer at age 45.  Have a colonoscopy test every 10 years (or more often if you're at risk) Or, ask your provider about stool tests like a FIT test every year or Cologuard test every 3 years.  To learn more about your testing options, visit:   .  For help making a decision, visit:   https://bit.ly/sw96556.  Prostate cancer screening test: If you have a prostate, ask your care team if a prostate cancer screening test (PSA) at age 55 is right for you.  Lung cancer screening: If you are a current or former smoker ages 50 to 80, ask your care team if ongoing lung cancer screenings are right for you.  For informational purposes only. Not to replace the advice of your health care provider. Copyright   2023 [employer]. All rights reserved. Clinically reviewed by the [facility] Transitions Program. [employer] [id number] - REV 01/24.

## 2025-04-29 DIAGNOSIS — E03.9 HYPOTHYROIDISM, UNSPECIFIED TYPE: ICD-10-CM

## 2025-04-29 LAB
CHOLEST SERPL-MCNC: 147 MG/DL
FASTING STATUS PATIENT QL REPORTED: NO
HDLC SERPL-MCNC: 38 MG/DL
LDLC SERPL CALC-MCNC: 95 MG/DL
NONHDLC SERPL-MCNC: 109 MG/DL
T4 FREE SERPL-MCNC: 0.74 NG/DL (ref 0.9–1.7)
TRIGL SERPL-MCNC: 69 MG/DL
TSH SERPL DL<=0.005 MIU/L-ACNC: 27 UIU/ML (ref 0.3–4.2)

## 2025-04-29 RX ORDER — LEVOTHYROXINE SODIUM 137 UG/1
137 TABLET ORAL
Qty: 30 TABLET | Refills: 2 | Status: SHIPPED | OUTPATIENT
Start: 2025-04-29

## 2025-06-06 ENCOUNTER — MYC REFILL (OUTPATIENT)
Dept: FAMILY MEDICINE | Facility: CLINIC | Age: 35
End: 2025-06-06
Payer: COMMERCIAL

## 2025-06-06 DIAGNOSIS — E66.9 OBESITY (BMI 30-39.9): ICD-10-CM

## 2025-06-09 DIAGNOSIS — E66.9 OBESITY (BMI 30-39.9): ICD-10-CM

## 2025-06-09 RX ORDER — SEMAGLUTIDE 2.4 MG/.75ML
INJECTION, SOLUTION SUBCUTANEOUS
Qty: 4 ML | Refills: 0 | OUTPATIENT
Start: 2025-06-09

## 2025-06-10 ENCOUNTER — MYC REFILL (OUTPATIENT)
Dept: FAMILY MEDICINE | Facility: CLINIC | Age: 35
End: 2025-06-10
Payer: COMMERCIAL

## 2025-06-10 ENCOUNTER — TELEPHONE (OUTPATIENT)
Dept: FAMILY MEDICINE | Facility: CLINIC | Age: 35
End: 2025-06-10
Payer: COMMERCIAL

## 2025-06-10 DIAGNOSIS — E66.9 OBESITY (BMI 30-39.9): ICD-10-CM

## 2025-06-10 NOTE — TELEPHONE ENCOUNTER
[employer] message sent to patient.     Thanks,  ERWIN Bonilla  [facility]      yes

## 2025-06-10 NOTE — TELEPHONE ENCOUNTER
Can pt please have PA started for below?        Thanks,  ERWIN Bonilla  [facility]

## 2025-06-12 NOTE — TELEPHONE ENCOUNTER
PA Initiation    Medication: WEGOVY 2.4 MG/0.75ML SC SOAJ  Insurance Company: [employer]Specialty PA's - Phone [phone] Fax [phone]  Pharmacy Filling the Rx: [facility] [address] [place], [place] - [address]  Filling Pharmacy Phone: [phone]  Filling Pharmacy Fax: [phone]  Start Date: 6/12/2025

## 2025-06-12 NOTE — TELEPHONE ENCOUNTER
Prior Authorization Not Needed per Insurance    Medication: WEGOVY 2.4 MG/0.75ML SC SOAJ  Insurance Company: [employer] Non-Specialty PA's - Phone [phone] Fax [phone]  Expected CoPay: $    Pharmacy Filling the Rx: [facility] [address] [facility] [address] [address]  Pharmacy Notified: YES  Patient Notified: **Instructed pharmacy to notify patient when script is ready to /ship.**

## 2025-07-29 ENCOUNTER — LAB (OUTPATIENT)
Dept: LAB | Facility: OTHER | Age: 35
End: 2025-07-29
Payer: COMMERCIAL

## 2025-07-29 DIAGNOSIS — E03.9 ACQUIRED HYPOTHYROIDISM: ICD-10-CM

## 2025-07-29 LAB — TSH SERPL DL<=0.005 MIU/L-ACNC: 0.49 UIU/ML (ref 0.3–4.2)

## 2025-07-29 PROCEDURE — 84443 ASSAY THYROID STIM HORMONE: CPT

## 2025-07-29 PROCEDURE — 36415 COLL VENOUS BLD VENIPUNCTURE: CPT

## 2025-07-30 ENCOUNTER — MYC REFILL (OUTPATIENT)
Dept: FAMILY MEDICINE | Facility: CLINIC | Age: 35
End: 2025-07-30
Payer: COMMERCIAL

## 2025-07-30 DIAGNOSIS — E03.9 HYPOTHYROIDISM, UNSPECIFIED TYPE: ICD-10-CM

## 2025-07-30 RX ORDER — LEVOTHYROXINE SODIUM 137 UG/1
137 TABLET ORAL
Qty: 90 TABLET | Refills: 2 | Status: SHIPPED | OUTPATIENT
Start: 2025-07-30

## 2025-08-19 ENCOUNTER — OFFICE VISIT (OUTPATIENT)
Dept: FAMILY MEDICINE | Facility: CLINIC | Age: 35
End: 2025-08-19
Payer: COMMERCIAL

## 2025-08-19 VITALS
WEIGHT: 171.5 LBS | RESPIRATION RATE: 23 BRPM | BODY MASS INDEX: 28.57 KG/M2 | HEIGHT: 65 IN | DIASTOLIC BLOOD PRESSURE: 78 MMHG | OXYGEN SATURATION: 99 % | HEART RATE: 73 BPM | SYSTOLIC BLOOD PRESSURE: 96 MMHG | TEMPERATURE: 98.3 F

## 2025-08-19 DIAGNOSIS — H69.92 DYSFUNCTION OF LEFT EUSTACHIAN TUBE: Primary | ICD-10-CM

## 2025-08-19 PROCEDURE — 3078F DIAST BP <80 MM HG: CPT | Performed by: PHYSICIAN ASSISTANT

## 2025-08-19 PROCEDURE — 3074F SYST BP LT 130 MM HG: CPT | Performed by: PHYSICIAN ASSISTANT

## 2025-08-19 PROCEDURE — 99212 OFFICE O/P EST SF 10 MIN: CPT | Performed by: PHYSICIAN ASSISTANT

## 2025-08-19 PROCEDURE — 1126F AMNT PAIN NOTED NONE PRSNT: CPT | Performed by: PHYSICIAN ASSISTANT

## 2025-08-19 RX ORDER — FLUTICASONE PROPIONATE 50 MCG
2 SPRAY, SUSPENSION (ML) NASAL DAILY
Qty: 16 G | Refills: 1 | Status: SHIPPED | OUTPATIENT
Start: 2025-08-19 | End: 2025-09-18

## 2025-08-19 ASSESSMENT — PATIENT HEALTH QUESTIONNAIRE - PHQ9
10. IF YOU CHECKED OFF ANY PROBLEMS, HOW DIFFICULT HAVE THESE PROBLEMS MADE IT FOR YOU TO DO YOUR WORK, TAKE CARE OF THINGS AT HOME, OR GET ALONG WITH OTHER PEOPLE: NOT DIFFICULT AT ALL
SUM OF ALL RESPONSES TO PHQ QUESTIONS 1-9: 2
SUM OF ALL RESPONSES TO PHQ QUESTIONS 1-9: 2

## 2025-08-19 ASSESSMENT — PAIN SCALES - GENERAL: PAINLEVEL_OUTOF10: NO PAIN (0)

## 2025-08-26 ENCOUNTER — OFFICE VISIT (OUTPATIENT)
Dept: FAMILY MEDICINE | Facility: CLINIC | Age: 35
End: 2025-08-26
Payer: COMMERCIAL

## 2025-08-26 ENCOUNTER — TELEPHONE (OUTPATIENT)
Dept: FAMILY MEDICINE | Facility: CLINIC | Age: 35
End: 2025-08-26

## 2025-08-26 VITALS
TEMPERATURE: 97.4 F | HEIGHT: 65 IN | DIASTOLIC BLOOD PRESSURE: 74 MMHG | WEIGHT: 170.2 LBS | SYSTOLIC BLOOD PRESSURE: 102 MMHG | RESPIRATION RATE: 16 BRPM | BODY MASS INDEX: 28.36 KG/M2 | HEART RATE: 82 BPM | OXYGEN SATURATION: 97 %

## 2025-08-26 DIAGNOSIS — K64.4 EXTERNAL HEMORRHOIDS: Primary | ICD-10-CM

## 2025-08-26 DIAGNOSIS — Z30.431 IUD CHECK UP: ICD-10-CM

## 2025-08-26 PROCEDURE — 99213 OFFICE O/P EST LOW 20 MIN: CPT | Performed by: NURSE PRACTITIONER

## 2025-08-26 PROCEDURE — 3074F SYST BP LT 130 MM HG: CPT | Performed by: NURSE PRACTITIONER

## 2025-08-26 PROCEDURE — 1126F AMNT PAIN NOTED NONE PRSNT: CPT | Performed by: NURSE PRACTITIONER

## 2025-08-26 PROCEDURE — 3078F DIAST BP <80 MM HG: CPT | Performed by: NURSE PRACTITIONER

## 2025-08-26 RX ORDER — HYDROCORTISONE 25 MG/G
CREAM TOPICAL 2 TIMES DAILY
Qty: 30 G | Refills: 0 | Status: SHIPPED | OUTPATIENT
Start: 2025-08-26 | End: 2025-09-05

## 2025-08-26 RX ORDER — HYDROCORTISONE ACETATE 25 MG/1
25 SUPPOSITORY RECTAL 2 TIMES DAILY
Qty: 10 SUPPOSITORY | Refills: 0 | Status: SHIPPED | OUTPATIENT
Start: 2025-08-26

## 2025-08-26 ASSESSMENT — PAIN SCALES - GENERAL: PAINLEVEL_OUTOF10: NO PAIN (0)
